# Patient Record
Sex: FEMALE | Race: WHITE | Employment: PART TIME | URBAN - METROPOLITAN AREA
[De-identification: names, ages, dates, MRNs, and addresses within clinical notes are randomized per-mention and may not be internally consistent; named-entity substitution may affect disease eponyms.]

---

## 2018-03-27 ENCOUNTER — TELEPHONE (OUTPATIENT)
Dept: FAMILY MEDICINE CLINIC | Age: 43
End: 2018-03-27

## 2018-03-27 DIAGNOSIS — Z00.00 ENCOUNTER FOR WELL ADULT EXAM WITHOUT ABNORMAL FINDINGS: Primary | ICD-10-CM

## 2018-03-27 DIAGNOSIS — Z13.220 LIPID SCREENING: ICD-10-CM

## 2018-04-16 ENCOUNTER — OFFICE VISIT (OUTPATIENT)
Dept: FAMILY MEDICINE CLINIC | Age: 43
End: 2018-04-16
Payer: COMMERCIAL

## 2018-04-16 VITALS
DIASTOLIC BLOOD PRESSURE: 88 MMHG | BODY MASS INDEX: 33.99 KG/M2 | OXYGEN SATURATION: 97 % | RESPIRATION RATE: 16 BRPM | HEIGHT: 61 IN | HEART RATE: 90 BPM | SYSTOLIC BLOOD PRESSURE: 128 MMHG | TEMPERATURE: 98.2 F | WEIGHT: 180 LBS

## 2018-04-16 DIAGNOSIS — H66.90 ACUTE OTITIS MEDIA, UNSPECIFIED OTITIS MEDIA TYPE: Primary | ICD-10-CM

## 2018-04-16 PROCEDURE — 99213 OFFICE O/P EST LOW 20 MIN: CPT | Performed by: PHYSICIAN ASSISTANT

## 2018-04-16 RX ORDER — CEFDINIR 300 MG/1
300 CAPSULE ORAL 2 TIMES DAILY
Qty: 20 CAPSULE | Refills: 0 | Status: SHIPPED | OUTPATIENT
Start: 2018-04-16 | End: 2018-04-26

## 2018-04-16 RX ORDER — M-VIT,TX,IRON,MINS/CALC/FOLIC 27MG-0.4MG
1 TABLET ORAL DAILY
COMMUNITY
End: 2019-11-11

## 2018-04-20 ENCOUNTER — HOSPITAL ENCOUNTER (OUTPATIENT)
Age: 43
Discharge: HOME | End: 2018-04-22
Payer: COMMERCIAL

## 2018-04-20 ENCOUNTER — NURSE ONLY (OUTPATIENT)
Dept: FAMILY MEDICINE CLINIC | Age: 43
End: 2018-04-20
Payer: COMMERCIAL

## 2018-04-20 DIAGNOSIS — Z00.00 ENCOUNTER FOR WELL ADULT EXAM WITHOUT ABNORMAL FINDINGS: ICD-10-CM

## 2018-04-20 DIAGNOSIS — Z13.220 LIPID SCREENING: ICD-10-CM

## 2018-04-20 DIAGNOSIS — Z13.220 LIPID SCREENING: Primary | ICD-10-CM

## 2018-04-20 LAB
ALBUMIN SERPL-MCNC: 4.1 G/DL (ref 3.5–5.2)
ALP BLD-CCNC: 67 U/L (ref 35–104)
ALT SERPL-CCNC: 21 U/L (ref 0–32)
ANION GAP SERPL CALCULATED.3IONS-SCNC: 14 MMOL/L (ref 7–16)
AST SERPL-CCNC: 19 U/L (ref 0–31)
BASOPHILS ABSOLUTE: 0.04 E9/L (ref 0–0.2)
BASOPHILS RELATIVE PERCENT: 0.5 % (ref 0–2)
BILIRUB SERPL-MCNC: 0.4 MG/DL (ref 0–1.2)
BUN BLDV-MCNC: 9 MG/DL (ref 6–20)
CALCIUM SERPL-MCNC: 9.2 MG/DL (ref 8.6–10.2)
CHLORIDE BLD-SCNC: 102 MMOL/L (ref 98–107)
CHOLESTEROL, TOTAL: 223 MG/DL (ref 0–199)
CO2: 24 MMOL/L (ref 22–29)
CREAT SERPL-MCNC: 0.7 MG/DL (ref 0.5–1)
EOSINOPHILS ABSOLUTE: 0.28 E9/L (ref 0.05–0.5)
EOSINOPHILS RELATIVE PERCENT: 3.6 % (ref 0–6)
GFR AFRICAN AMERICAN: >60
GFR NON-AFRICAN AMERICAN: >60 ML/MIN/1.73
GLUCOSE BLD-MCNC: 80 MG/DL (ref 74–109)
HCT VFR BLD CALC: 42 % (ref 34–48)
HDLC SERPL-MCNC: 51 MG/DL
HEMOGLOBIN: 13.3 G/DL (ref 11.5–15.5)
IMMATURE GRANULOCYTES #: 0.02 E9/L
IMMATURE GRANULOCYTES %: 0.3 % (ref 0–5)
LDL CHOLESTEROL CALCULATED: 142 MG/DL (ref 0–99)
LYMPHOCYTES ABSOLUTE: 1.73 E9/L (ref 1.5–4)
LYMPHOCYTES RELATIVE PERCENT: 22.1 % (ref 20–42)
MCH RBC QN AUTO: 28.6 PG (ref 26–35)
MCHC RBC AUTO-ENTMCNC: 31.7 % (ref 32–34.5)
MCV RBC AUTO: 90.3 FL (ref 80–99.9)
MONOCYTES ABSOLUTE: 0.48 E9/L (ref 0.1–0.95)
MONOCYTES RELATIVE PERCENT: 6.1 % (ref 2–12)
NEUTROPHILS ABSOLUTE: 5.29 E9/L (ref 1.8–7.3)
NEUTROPHILS RELATIVE PERCENT: 67.4 % (ref 43–80)
PDW BLD-RTO: 14.2 FL (ref 11.5–15)
PLATELET # BLD: 310 E9/L (ref 130–450)
PMV BLD AUTO: 10.3 FL (ref 7–12)
POTASSIUM SERPL-SCNC: 4.1 MMOL/L (ref 3.5–5)
RBC # BLD: 4.65 E12/L (ref 3.5–5.5)
SODIUM BLD-SCNC: 140 MMOL/L (ref 132–146)
TOTAL PROTEIN: 7.2 G/DL (ref 6.4–8.3)
TRIGL SERPL-MCNC: 150 MG/DL (ref 0–149)
VITAMIN D 25-HYDROXY: 25 NG/ML (ref 30–100)
VLDLC SERPL CALC-MCNC: 30 MG/DL
WBC # BLD: 7.8 E9/L (ref 4.5–11.5)

## 2018-04-20 PROCEDURE — 80053 COMPREHEN METABOLIC PANEL: CPT

## 2018-04-20 PROCEDURE — 82306 VITAMIN D 25 HYDROXY: CPT

## 2018-04-20 PROCEDURE — 85025 COMPLETE CBC W/AUTO DIFF WBC: CPT

## 2018-04-20 PROCEDURE — 36415 COLL VENOUS BLD VENIPUNCTURE: CPT | Performed by: FAMILY MEDICINE

## 2018-04-20 PROCEDURE — 80061 LIPID PANEL: CPT

## 2018-04-24 ENCOUNTER — OFFICE VISIT (OUTPATIENT)
Dept: FAMILY MEDICINE CLINIC | Age: 43
End: 2018-04-24
Payer: COMMERCIAL

## 2018-04-24 VITALS
HEIGHT: 61 IN | BODY MASS INDEX: 33.61 KG/M2 | WEIGHT: 178 LBS | RESPIRATION RATE: 17 BRPM | OXYGEN SATURATION: 99 % | SYSTOLIC BLOOD PRESSURE: 120 MMHG | DIASTOLIC BLOOD PRESSURE: 82 MMHG | HEART RATE: 68 BPM | TEMPERATURE: 98.4 F

## 2018-04-24 DIAGNOSIS — H69.81 EUSTACHIAN TUBE DYSFUNCTION, RIGHT: Primary | ICD-10-CM

## 2018-04-24 DIAGNOSIS — R59.0 ADENOPATHY, CERVICAL: ICD-10-CM

## 2018-04-24 PROCEDURE — 99213 OFFICE O/P EST LOW 20 MIN: CPT | Performed by: PHYSICIAN ASSISTANT

## 2018-04-24 RX ORDER — FLUTICASONE PROPIONATE 50 MCG
2 SPRAY, SUSPENSION (ML) NASAL DAILY
Qty: 1 BOTTLE | Refills: 0 | Status: SHIPPED | OUTPATIENT
Start: 2018-04-24 | End: 2018-09-19 | Stop reason: SDUPTHER

## 2018-04-24 RX ORDER — NAPROXEN 500 MG/1
500 TABLET ORAL 2 TIMES DAILY PRN
Qty: 20 TABLET | Refills: 0 | Status: SHIPPED | OUTPATIENT
Start: 2018-04-24 | End: 2018-09-07 | Stop reason: ALTCHOICE

## 2018-04-27 ENCOUNTER — OFFICE VISIT (OUTPATIENT)
Dept: FAMILY MEDICINE CLINIC | Age: 43
End: 2018-04-27
Payer: COMMERCIAL

## 2018-04-27 VITALS
BODY MASS INDEX: 33.82 KG/M2 | OXYGEN SATURATION: 99 % | WEIGHT: 179 LBS | TEMPERATURE: 97.5 F | SYSTOLIC BLOOD PRESSURE: 124 MMHG | HEART RATE: 72 BPM | DIASTOLIC BLOOD PRESSURE: 86 MMHG

## 2018-04-27 DIAGNOSIS — J30.1 CHRONIC SEASONAL ALLERGIC RHINITIS DUE TO POLLEN: ICD-10-CM

## 2018-04-27 DIAGNOSIS — E66.3 EXCESS WEIGHT: ICD-10-CM

## 2018-04-27 DIAGNOSIS — R92.2 DENSE BREAST TISSUE ON MAMMOGRAM: ICD-10-CM

## 2018-04-27 DIAGNOSIS — Z00.00 ENCOUNTER FOR WELL ADULT EXAM WITHOUT ABNORMAL FINDINGS: Primary | ICD-10-CM

## 2018-04-27 DIAGNOSIS — E78.5 HYPERLIPIDEMIA, UNSPECIFIED HYPERLIPIDEMIA TYPE: ICD-10-CM

## 2018-04-27 DIAGNOSIS — K29.50 CHRONIC GASTRITIS, PRESENCE OF BLEEDING UNSPECIFIED, UNSPECIFIED GASTRITIS TYPE: ICD-10-CM

## 2018-04-27 DIAGNOSIS — Z12.31 ENCOUNTER FOR MAMMOGRAM TO ESTABLISH BASELINE MAMMOGRAM: ICD-10-CM

## 2018-04-27 PROCEDURE — 99214 OFFICE O/P EST MOD 30 MIN: CPT | Performed by: FAMILY MEDICINE

## 2018-04-27 RX ORDER — RANITIDINE 150 MG/1
150 TABLET ORAL 2 TIMES DAILY PRN
Qty: 60 TABLET | Refills: 2 | Status: SHIPPED | OUTPATIENT
Start: 2018-04-27 | End: 2018-09-07 | Stop reason: ALTCHOICE

## 2018-04-27 RX ORDER — CETIRIZINE HYDROCHLORIDE 10 MG/1
10 TABLET ORAL DAILY
Qty: 30 TABLET | Refills: 5 | Status: SHIPPED | OUTPATIENT
Start: 2018-04-27 | End: 2019-01-16 | Stop reason: SDUPTHER

## 2018-05-14 ENCOUNTER — TELEPHONE (OUTPATIENT)
Dept: FAMILY MEDICINE CLINIC | Age: 43
End: 2018-05-14

## 2018-07-02 DIAGNOSIS — R92.2 DENSE BREAST TISSUE ON MAMMOGRAM: ICD-10-CM

## 2018-07-02 DIAGNOSIS — Z12.31 ENCOUNTER FOR MAMMOGRAM TO ESTABLISH BASELINE MAMMOGRAM: ICD-10-CM

## 2018-07-24 ENCOUNTER — TELEPHONE (OUTPATIENT)
Dept: FAMILY MEDICINE CLINIC | Age: 43
End: 2018-07-24

## 2018-07-24 NOTE — PROGRESS NOTES
Subjective:      Patient ID: León Mercedes is a 39 y.o. female. Spoke with patient, normal mammogram and US. Repeat 1 year.         Review of Systems    Objective:   Physical Exam    Assessment:      ***      Plan:      ***

## 2018-07-24 NOTE — TELEPHONE ENCOUNTER
Spoke with patient regarding mammogram and US reports- dr reviewed, both tests negative, repeat in 1 year, patient verbalizes understanding.

## 2018-08-07 ENCOUNTER — TELEPHONE (OUTPATIENT)
Dept: FAMILY MEDICINE CLINIC | Age: 43
End: 2018-08-07

## 2018-08-07 DIAGNOSIS — Z80.0 FAMILY HISTORY OF COLON CANCER: Primary | ICD-10-CM

## 2018-08-07 DIAGNOSIS — R19.4 CHANGE IN BOWEL HABITS: ICD-10-CM

## 2018-08-08 NOTE — TELEPHONE ENCOUNTER
Spoke with pt and she states yes about her grandmother. She reports having change in her bowels for the last month. She states she has diarrhea and blood. She is not sure if the blood is form a hemorrhoids.

## 2018-08-21 ENCOUNTER — NURSE ONLY (OUTPATIENT)
Dept: FAMILY MEDICINE CLINIC | Age: 43
End: 2018-08-21
Payer: COMMERCIAL

## 2018-08-21 ENCOUNTER — HOSPITAL ENCOUNTER (OUTPATIENT)
Age: 43
Discharge: HOME | End: 2018-08-23
Payer: COMMERCIAL

## 2018-08-21 ENCOUNTER — TELEPHONE (OUTPATIENT)
Dept: FAMILY MEDICINE CLINIC | Age: 43
End: 2018-08-21

## 2018-08-21 DIAGNOSIS — R30.0 DYSURIA: Primary | ICD-10-CM

## 2018-08-21 DIAGNOSIS — R30.0 DYSURIA: ICD-10-CM

## 2018-08-21 LAB
BILIRUBIN, POC: NORMAL
BLOOD URINE, POC: NORMAL
CLARITY, POC: CLEAR
COLOR, POC: YELLOW
GLUCOSE URINE, POC: NORMAL
KETONES, POC: NORMAL
LEUKOCYTE EST, POC: NORMAL
NITRITE, POC: NORMAL
PH, POC: 6
PROTEIN, POC: NORMAL
SPECIFIC GRAVITY, POC: 1
UROBILINOGEN, POC: 0.2

## 2018-08-21 PROCEDURE — 81002 URINALYSIS NONAUTO W/O SCOPE: CPT | Performed by: FAMILY MEDICINE

## 2018-08-21 PROCEDURE — 87088 URINE BACTERIA CULTURE: CPT

## 2018-08-21 RX ORDER — SULFAMETHOXAZOLE AND TRIMETHOPRIM 800; 160 MG/1; MG/1
1 TABLET ORAL 2 TIMES DAILY
Qty: 6 TABLET | Refills: 0 | Status: SHIPPED | OUTPATIENT
Start: 2018-08-21 | End: 2018-08-24

## 2018-08-23 LAB — URINE CULTURE, ROUTINE: NORMAL

## 2018-08-30 ENCOUNTER — PREP FOR PROCEDURE (OUTPATIENT)
Dept: SURGERY | Age: 43
End: 2018-08-30

## 2018-08-30 ENCOUNTER — OFFICE VISIT (OUTPATIENT)
Dept: SURGERY | Age: 43
End: 2018-08-30
Payer: COMMERCIAL

## 2018-08-30 VITALS
HEART RATE: 80 BPM | BODY MASS INDEX: 32.85 KG/M2 | SYSTOLIC BLOOD PRESSURE: 129 MMHG | WEIGHT: 174 LBS | TEMPERATURE: 98.3 F | RESPIRATION RATE: 16 BRPM | DIASTOLIC BLOOD PRESSURE: 85 MMHG | HEIGHT: 61 IN | OXYGEN SATURATION: 96 %

## 2018-08-30 DIAGNOSIS — R19.4 CHANGE IN BOWEL HABITS: Primary | ICD-10-CM

## 2018-08-30 PROCEDURE — 99243 OFF/OP CNSLTJ NEW/EST LOW 30: CPT | Performed by: SURGERY

## 2018-08-30 RX ORDER — 0.9 % SODIUM CHLORIDE 0.9 %
10 VIAL (ML) INJECTION PRN
Status: CANCELLED | OUTPATIENT
Start: 2018-08-30 | End: 2019-08-30

## 2018-08-30 RX ORDER — SODIUM CHLORIDE 9 MG/ML
INJECTION, SOLUTION INTRAVENOUS CONTINUOUS
Status: CANCELLED | OUTPATIENT
Start: 2018-08-30 | End: 2019-08-30

## 2018-08-30 RX ORDER — 0.9 % SODIUM CHLORIDE 0.9 %
10 VIAL (ML) INJECTION EVERY 12 HOURS SCHEDULED
Status: CANCELLED | OUTPATIENT
Start: 2018-08-30 | End: 2019-08-30

## 2018-08-30 NOTE — LETTER
[address] [address]  Phone: [phone]  Fax: [phone]    Xochitl Stallings MD        August 30, 2018       Patient: Yola Sánchez   MR Number: [id number]   YOB: 1975   Date of Visit: 8/30/2018       Dear Dr. Bri Tomas: Thank you for the request for consultation for Yola Sánchez to me for the evaluation of her colon. Below are the relevant portions of my assessment and plan of care. ASSESSMENT AND PLAN:       Assessment: Yola Fletcher is an 39 y.o. female who presents for a colonoscopy with rectal bleeding, change in bowel habits    Plan: I will set the patient up for a colonoscopy, possible biopsy, possible polypectomy. If you have questions, please do not hesitate to call me. I look forward to following April along with you. Sincerely,        Fernandez Fisher MD    CC providers: Hazel Stanford MD  [facility] [address]  [address] [facility]

## 2018-08-30 NOTE — PATIENT INSTRUCTIONS
and grape-flavored ice pops. It also includes fruit punch and cherry gelatin. · Drink the \"colon prep\" liquid as your doctor tells you. You will want to stay home, because the liquid will make you go to the bathroom a lot. Your stools will be loose and watery. It is very important to drink all of the liquid. If you have problems drinking it, call your doctor. Some doctors may have you take a tablet rather than drink a liquid. · Do not eat any solid foods after you drink the colon prep. · Stop drinking clear liquids 6 to 8 hours before the test.  What happens on the day of the procedure? · Follow the instructions exactly about when to stop eating and drinking. If you don't, your procedure may be canceled. If your doctor told you to take your medicines on the day of the procedure, take them with only a sip of water. · Take a bath or shower before you come in for your procedure. Do not apply lotions, perfumes, deodorants, or nail polish. · Take off all jewelry and piercings. And take out contact lenses, if you wear them. At the [facility] office or hospital  · Bring a picture ID. · You will be kept comfortable and safe by your anesthesia provider. The anesthesia may make you sleep. · You will lie on your back or your side with your knees drawn up toward your belly. The doctor will gently put a gloved finger into your anus. Then the doctor puts the scope in and moves it into your colon. The scope goes in easily because it is lubricated. · The doctor may also use small tools to take tissue samples for a biopsy or to remove polyps. This does not hurt. · The test usually takes 30 to 45 minutes. But it may take longer. It depends on what is found and what is done. Going home  · Be sure you have someone to drive you home. Anesthesia and pain medicine make it unsafe for you to drive. · You will be given more specific instructions about recovering from your procedure. When should you call your doctor?   · You have questions or concerns. · You don't understand how to prepare for your procedure. · You are having trouble with the bowel prep. · You become ill before the procedure (such as fever, flu, or a cold). · You need to reschedule or have changed your mind about having the procedure. Where can you learn more? Go to https://Virgin Mobile Latin [email or web address].Testlio. org and sign in to your LittleLives account. Enter C315 in the Quitbit box to learn more about Colonoscopy: Before Your Procedure.     If you do not have an account, please click on the Sign Up Now link. © 0614-5308 Healthwise, Incorporated. Care instructions adapted under license by [facility] ([facility]). This care instruction is for use with your licensed healthcare professional. If you have questions about a medical condition or this instruction, always ask your healthcare professional. [address] any warranty or liability for your use of this information.   Content Version: 61.0.059188; Current as of: November 20, 2015

## 2018-09-07 RX ORDER — OMEPRAZOLE 20 MG/1
20 CAPSULE, DELAYED RELEASE ORAL PRN
COMMUNITY
End: 2019-06-04 | Stop reason: SDUPTHER

## 2018-09-07 NOTE — PROGRESS NOTES
products on the day of surgery    [x] If not already done, you can expect a call from registration    [x] You can expect a call the business day prior to procedure to notify you if your arrival time changes    [x] If you receive a survey after surgery we would greatly appreciate your comments    [] Parent/guardian of a minor must accompany their child and remain on the premises  the entire time they are under our care     [] Pediatric patients may bring favorite toy, blanket or comfort item with them    [] A caregiver or family member must remain with the patient during their stay if they are mentally handicapped, have dementia, disoriented or unable to use a call light or would be a safety concern if left unattended    [x] Please notify surgeon if you develop any illness between now and time of surgery (cold, cough, sore throat, fever, nausea, vomiting) or any signs of infections  including skin, wounds, and dental.    [] Other instructions    EDUCATIONAL MATERIALS PROVIDED:    [] PAT Preoperative Education Packet/Booklet     [] Medication List    [] Fluoroscopy Information Pamphlet    [] Transfusion bracelet applied with instructions    [] Joint replacement video reviewed    [] Shower with soap, lather and rinse well, and use CHG wipes provided the evening before surgery as instructed

## 2018-09-17 ENCOUNTER — ANESTHESIA EVENT (OUTPATIENT)
Dept: ENDOSCOPY | Age: 43
End: 2018-09-17
Payer: COMMERCIAL

## 2018-09-17 ENCOUNTER — HOSPITAL ENCOUNTER (OUTPATIENT)
Age: 43
Setting detail: OUTPATIENT SURGERY
Discharge: HOME | End: 2018-09-17
Attending: SURGERY | Admitting: SURGERY
Payer: COMMERCIAL

## 2018-09-17 ENCOUNTER — ANESTHESIA (OUTPATIENT)
Dept: ENDOSCOPY | Age: 43
End: 2018-09-17
Payer: COMMERCIAL

## 2018-09-17 VITALS
DIASTOLIC BLOOD PRESSURE: 68 MMHG | RESPIRATION RATE: 15 BRPM | OXYGEN SATURATION: 93 % | SYSTOLIC BLOOD PRESSURE: 136 MMHG

## 2018-09-17 VITALS
BODY MASS INDEX: 32.47 KG/M2 | HEART RATE: 80 BPM | HEIGHT: 61 IN | RESPIRATION RATE: 18 BRPM | TEMPERATURE: 98 F | WEIGHT: 172 LBS | SYSTOLIC BLOOD PRESSURE: 120 MMHG | OXYGEN SATURATION: 97 % | DIASTOLIC BLOOD PRESSURE: 70 MMHG

## 2018-09-17 PROCEDURE — 3609009500 HC COLONOSCOPY DIAGNOSTIC OR SCREENING: Performed by: SURGERY

## 2018-09-17 PROCEDURE — 2580000003 HC RX 258: Performed by: NURSE ANESTHETIST, CERTIFIED REGISTERED

## 2018-09-17 PROCEDURE — 7100000010 HC PHASE II RECOVERY - FIRST 15 MIN: Performed by: SURGERY

## 2018-09-17 PROCEDURE — 7100000011 HC PHASE II RECOVERY - ADDTL 15 MIN: Performed by: SURGERY

## 2018-09-17 PROCEDURE — 3700000000 HC ANESTHESIA ATTENDED CARE: Performed by: SURGERY

## 2018-09-17 PROCEDURE — 45378 DIAGNOSTIC COLONOSCOPY: CPT | Performed by: SURGERY

## 2018-09-17 PROCEDURE — 2709999900 HC NON-CHARGEABLE SUPPLY: Performed by: SURGERY

## 2018-09-17 PROCEDURE — 6360000002 HC RX W HCPCS: Performed by: NURSE ANESTHETIST, CERTIFIED REGISTERED

## 2018-09-17 PROCEDURE — 3700000001 HC ADD 15 MINUTES (ANESTHESIA): Performed by: SURGERY

## 2018-09-17 RX ORDER — FENTANYL CITRATE 50 UG/ML
INJECTION, SOLUTION INTRAMUSCULAR; INTRAVENOUS PRN
Status: DISCONTINUED | OUTPATIENT
Start: 2018-09-17 | End: 2018-09-17 | Stop reason: SDUPTHER

## 2018-09-17 RX ORDER — SODIUM CHLORIDE 9 MG/ML
INJECTION, SOLUTION INTRAVENOUS CONTINUOUS
Status: DISCONTINUED | OUTPATIENT
Start: 2018-09-17 | End: 2018-09-17 | Stop reason: HOSPADM

## 2018-09-17 RX ORDER — PROPOFOL 10 MG/ML
INJECTION, EMULSION INTRAVENOUS PRN
Status: DISCONTINUED | OUTPATIENT
Start: 2018-09-17 | End: 2018-09-17 | Stop reason: SDUPTHER

## 2018-09-17 RX ORDER — SODIUM CHLORIDE 9 MG/ML
INJECTION, SOLUTION INTRAVENOUS CONTINUOUS PRN
Status: DISCONTINUED | OUTPATIENT
Start: 2018-09-17 | End: 2018-09-17 | Stop reason: SDUPTHER

## 2018-09-17 RX ORDER — MIDAZOLAM HYDROCHLORIDE 1 MG/ML
INJECTION INTRAMUSCULAR; INTRAVENOUS PRN
Status: DISCONTINUED | OUTPATIENT
Start: 2018-09-17 | End: 2018-09-17 | Stop reason: SDUPTHER

## 2018-09-17 RX ORDER — 0.9 % SODIUM CHLORIDE 0.9 %
10 VIAL (ML) INJECTION PRN
Status: DISCONTINUED | OUTPATIENT
Start: 2018-09-17 | End: 2018-09-17 | Stop reason: HOSPADM

## 2018-09-17 RX ORDER — 0.9 % SODIUM CHLORIDE 0.9 %
10 VIAL (ML) INJECTION EVERY 12 HOURS SCHEDULED
Status: DISCONTINUED | OUTPATIENT
Start: 2018-09-17 | End: 2018-09-17 | Stop reason: HOSPADM

## 2018-09-17 RX ADMIN — PROPOFOL 150 MG: 10 INJECTION, EMULSION INTRAVENOUS at 11:03

## 2018-09-17 RX ADMIN — MIDAZOLAM HYDROCHLORIDE 2 MG: 1 INJECTION, SOLUTION INTRAMUSCULAR; INTRAVENOUS at 11:09

## 2018-09-17 RX ADMIN — FENTANYL CITRATE 100 MCG: 50 INJECTION, SOLUTION INTRAMUSCULAR; INTRAVENOUS at 11:03

## 2018-09-17 RX ADMIN — PROPOFOL 20 MG: 10 INJECTION, EMULSION INTRAVENOUS at 11:16

## 2018-09-17 RX ADMIN — SODIUM CHLORIDE: 9 INJECTION, SOLUTION INTRAVENOUS at 10:35

## 2018-09-17 RX ADMIN — PROPOFOL 100 MG: 10 INJECTION, EMULSION INTRAVENOUS at 11:12

## 2018-09-17 ASSESSMENT — PAIN - FUNCTIONAL ASSESSMENT: PAIN_FUNCTIONAL_ASSESSMENT: 0-10

## 2018-09-17 NOTE — ANESTHESIA PRE PROCEDURE
migraine     PONV (postoperative nausea and vomiting)        Past Surgical History:        Procedure Laterality Date    ADENOIDECTOMY  0     SECTION     Ce [place], [facility] SURGERY  2015    L4 or L5 Microdiscectomy    UPPER GASTROINTESTINAL ENDOSCOPY      resulted with alkaline reflux, Dr. Stephie Dent History:    Social History   Substance Use Topics    Smoking status: Never Smoker    Smokeless tobacco: Never Used    Alcohol use 1.2 oz/week     2 Glasses of wine per week      Comment: social                                Counseling given: Not Answered      Vital Signs (Current):   Vitals:    18 1059 18 1004   BP:  125/81   Pulse:  80   Resp:  18   Temp:  97 °F (36.1 °C)   TempSrc:  Temporal   SpO2:  99%   Weight: 172 lb (78 kg) 172 lb (78 kg)   Height: 5' 1\" (1.549 m) 5' 1\" (1.549 m)                                              BP Readings from Last 3 Encounters:   18 125/81   18 129/85   18 124/86       NPO Status: Time of last liquid consumption:                         Time of last solid consumption: 900                        Date of last liquid consumption: 18                        Date of last solid food consumption: 18    BMI:   Wt Readings from Last 3 Encounters:   18 172 lb (78 kg)   18 174 lb (78.9 kg)   18 179 lb (81.2 kg)     Body mass index is 32.5 kg/m².     CBC:   Lab Results   Component Value Date    WBC 7.8 2018    RBC 4.65 2018    HGB 13.3 2018    HCT 42.0 2018    MCV 90.3 2018    RDW 14.2 2018     2018       CMP:   Lab Results   Component Value Date     2018    K 4.1 2018     2018    CO2 24 2018    BUN 9 2018    CREATININE 0.7 2018    GFRAA >60 2018    LABGLOM >60 2018    GLUCOSE 80 2018    PROT 7.2 2018

## 2018-09-17 NOTE — ANESTHESIA POSTPROCEDURE EVALUATION
Department of Anesthesiology  Postprocedure Note    Patient: April D Rosalind Swain  MRN: [id number]  YOB: 1975  Date of evaluation: 9/17/2018  Time:  12:10 PM     Procedure Summary     Date:  09/17/18 Room / Location:  [place] [address] / [facility] ENDOSCOPY    Anesthesia Start:  1102 Anesthesia Stop:  0059    Procedure:  COLONOSCOPY DIAGNOSTIC (N/A ) Diagnosis:  (Oleta Pro)    Surgeon:  Andreas Nash MD Responsible Provider:  José Sellers MD    Anesthesia Type:  MAC ASA Status:  2          Anesthesia Type: MAC    Cheng Phase I: Cheng Score: 10    Cheng Phase II: Cheng Score: 10    Last vitals: Reviewed and per EMR flowsheets.        Anesthesia Post Evaluation    Patient location during evaluation: PACU  Patient participation: complete - patient participated  Level of consciousness: awake and alert  Pain score: 0  Airway patency: patent  Nausea & Vomiting: no vomiting and no nausea  Complications: no  Cardiovascular status: hemodynamically stable  Respiratory status: spontaneous ventilation  Hydration status: stable

## 2018-09-18 RX ORDER — FLUTICASONE PROPIONATE 50 MCG
SPRAY, SUSPENSION (ML) NASAL
Qty: 16 G | Refills: 0 | OUTPATIENT
Start: 2018-09-18

## 2018-09-19 RX ORDER — FLUTICASONE PROPIONATE 50 MCG
2 SPRAY, SUSPENSION (ML) NASAL DAILY
Qty: 1 BOTTLE | Refills: 2 | Status: SHIPPED | OUTPATIENT
Start: 2018-09-19 | End: 2019-05-03

## 2018-10-22 ENCOUNTER — OFFICE VISIT (OUTPATIENT)
Dept: FAMILY MEDICINE CLINIC | Age: 43
End: 2018-10-22
Payer: COMMERCIAL

## 2018-10-22 VITALS
HEART RATE: 70 BPM | DIASTOLIC BLOOD PRESSURE: 80 MMHG | BODY MASS INDEX: 33.44 KG/M2 | SYSTOLIC BLOOD PRESSURE: 138 MMHG | WEIGHT: 177 LBS | OXYGEN SATURATION: 97 % | TEMPERATURE: 97.7 F

## 2018-10-22 DIAGNOSIS — M77.11 RIGHT LATERAL EPICONDYLITIS: ICD-10-CM

## 2018-10-22 DIAGNOSIS — G47.33 OSA (OBSTRUCTIVE SLEEP APNEA): Primary | ICD-10-CM

## 2018-10-22 PROCEDURE — 99214 OFFICE O/P EST MOD 30 MIN: CPT | Performed by: FAMILY MEDICINE

## 2018-10-22 ASSESSMENT — PATIENT HEALTH QUESTIONNAIRE - PHQ9
SUM OF ALL RESPONSES TO PHQ QUESTIONS 1-9: 0
2. FEELING DOWN, DEPRESSED OR HOPELESS: 0
1. LITTLE INTEREST OR PLEASURE IN DOING THINGS: 0
SUM OF ALL RESPONSES TO PHQ QUESTIONS 1-9: 0
SUM OF ALL RESPONSES TO PHQ9 QUESTIONS 1 & 2: 0

## 2018-11-01 ENCOUNTER — TELEPHONE (OUTPATIENT)
Dept: FAMILY MEDICINE CLINIC | Age: 43
End: 2018-11-01

## 2018-11-01 NOTE — TELEPHONE ENCOUNTER
Pt phoned , she saw  on 10/22/18 and he placed a referral for a sleep study at [facility]. She has not heard from anyone and wanted to check on it. Can you please check on this for her? Thanks!

## 2018-11-12 NOTE — TELEPHONE ENCOUNTER
Faxed to Jayna [address] at [facility], they have it and are working on it.  Called patient and let her know she should be hearing from them

## 2018-11-27 ENCOUNTER — TELEPHONE (OUTPATIENT)
Dept: FAMILY MEDICINE CLINIC | Age: 43
End: 2018-11-27

## 2018-11-27 DIAGNOSIS — G47.33 OBSTRUCTIVE SLEEP APNEA SYNDROME: Primary | ICD-10-CM

## 2018-11-28 NOTE — TELEPHONE ENCOUNTER
[facility],   Is process same as for that other patient you checked on? If so, I will do the same thing. If she needs a home study referral or something like that, then I'll order.

## 2018-12-03 ENCOUNTER — TELEPHONE (OUTPATIENT)
Dept: FAMILY MEDICINE CLINIC | Age: 43
End: 2018-12-03

## 2019-01-15 ENCOUNTER — TELEPHONE (OUTPATIENT)
Dept: FAMILY MEDICINE CLINIC | Age: 44
End: 2019-01-15

## 2019-01-15 ENCOUNTER — HOSPITAL ENCOUNTER (OUTPATIENT)
Age: 44
Discharge: HOME | End: 2019-01-17
Payer: COMMERCIAL

## 2019-01-15 ENCOUNTER — NURSE ONLY (OUTPATIENT)
Dept: FAMILY MEDICINE CLINIC | Age: 44
End: 2019-01-15
Payer: COMMERCIAL

## 2019-01-15 DIAGNOSIS — R30.0 DYSURIA: Primary | ICD-10-CM

## 2019-01-15 DIAGNOSIS — G47.33 OBSTRUCTIVE SLEEP APNEA SYNDROME: ICD-10-CM

## 2019-01-15 LAB
BACTERIA: NORMAL /HPF
BILIRUBIN URINE: NEGATIVE
BILIRUBIN, POC: NEGATIVE
BLOOD URINE, POC: NORMAL
BLOOD, URINE: NORMAL
CLARITY, POC: NORMAL
CLARITY: CLEAR
COLOR, POC: YELLOW
COLOR: YELLOW
EPITHELIAL CELLS, UA: NORMAL /HPF
GLUCOSE URINE, POC: NEGATIVE
GLUCOSE URINE: NEGATIVE MG/DL
KETONES, POC: NEGATIVE
KETONES, URINE: NEGATIVE MG/DL
LEUKOCYTE EST, POC: NEGATIVE
LEUKOCYTE ESTERASE, URINE: NEGATIVE
NITRITE, POC: NEGATIVE
NITRITE, URINE: NEGATIVE
PH UA: 6.5 (ref 5–9)
PH, POC: 6.5
PROTEIN UA: NEGATIVE MG/DL
PROTEIN, POC: NEGATIVE
RBC UA: NORMAL /HPF (ref 0–2)
SPECIFIC GRAVITY UA: 1.02 (ref 1–1.03)
SPECIFIC GRAVITY, POC: 1.02
UROBILINOGEN, POC: 0.2
UROBILINOGEN, URINE: 0.2 E.U./DL
WBC UA: NORMAL /HPF (ref 0–5)

## 2019-01-15 PROCEDURE — 87088 URINE BACTERIA CULTURE: CPT

## 2019-01-15 PROCEDURE — 81001 URINALYSIS AUTO W/SCOPE: CPT

## 2019-01-15 PROCEDURE — 81002 URINALYSIS NONAUTO W/O SCOPE: CPT | Performed by: FAMILY MEDICINE

## 2019-01-16 ENCOUNTER — OFFICE VISIT (OUTPATIENT)
Dept: FAMILY MEDICINE CLINIC | Age: 44
End: 2019-01-16
Payer: COMMERCIAL

## 2019-01-16 ENCOUNTER — HOSPITAL ENCOUNTER (OUTPATIENT)
Age: 44
Discharge: HOME | End: 2019-01-18
Payer: COMMERCIAL

## 2019-01-16 VITALS
BODY MASS INDEX: 33.99 KG/M2 | WEIGHT: 180 LBS | DIASTOLIC BLOOD PRESSURE: 82 MMHG | OXYGEN SATURATION: 98 % | SYSTOLIC BLOOD PRESSURE: 120 MMHG | HEIGHT: 61 IN | TEMPERATURE: 97.4 F | HEART RATE: 58 BPM

## 2019-01-16 DIAGNOSIS — M25.511 CHRONIC RIGHT SHOULDER PAIN: ICD-10-CM

## 2019-01-16 DIAGNOSIS — R10.11 RUQ PAIN: Primary | ICD-10-CM

## 2019-01-16 DIAGNOSIS — R61 NIGHT SWEATS: ICD-10-CM

## 2019-01-16 DIAGNOSIS — R10.11 RUQ PAIN: ICD-10-CM

## 2019-01-16 DIAGNOSIS — G89.29 CHRONIC RIGHT SHOULDER PAIN: ICD-10-CM

## 2019-01-16 DIAGNOSIS — J30.1 CHRONIC SEASONAL ALLERGIC RHINITIS DUE TO POLLEN: ICD-10-CM

## 2019-01-16 LAB
ALBUMIN SERPL-MCNC: 4.4 G/DL (ref 3.5–5.2)
ALP BLD-CCNC: 60 U/L (ref 35–104)
ALT SERPL-CCNC: 13 U/L (ref 0–32)
ANION GAP SERPL CALCULATED.3IONS-SCNC: 14 MMOL/L (ref 7–16)
AST SERPL-CCNC: 14 U/L (ref 0–31)
BASOPHILS ABSOLUTE: 0.04 E9/L (ref 0–0.2)
BASOPHILS RELATIVE PERCENT: 0.5 % (ref 0–2)
BILIRUB SERPL-MCNC: 0.3 MG/DL (ref 0–1.2)
BILIRUBIN, POC: NORMAL
BLOOD URINE, POC: NORMAL
BUN BLDV-MCNC: 9 MG/DL (ref 6–20)
CALCIUM SERPL-MCNC: 9 MG/DL (ref 8.6–10.2)
CHLORIDE BLD-SCNC: 102 MMOL/L (ref 98–107)
CLARITY, POC: CLEAR
CO2: 24 MMOL/L (ref 22–29)
COLOR, POC: NORMAL
CREAT SERPL-MCNC: 0.8 MG/DL (ref 0.5–1)
EOSINOPHILS ABSOLUTE: 0.2 E9/L (ref 0.05–0.5)
EOSINOPHILS RELATIVE PERCENT: 2.3 % (ref 0–6)
GFR AFRICAN AMERICAN: >60
GFR NON-AFRICAN AMERICAN: >60 ML/MIN/1.73
GLUCOSE BLD-MCNC: 83 MG/DL (ref 74–99)
GLUCOSE URINE, POC: NORMAL
HCT VFR BLD CALC: 43.3 % (ref 34–48)
HEMOGLOBIN: 14.6 G/DL (ref 11.5–15.5)
IMMATURE GRANULOCYTES #: 0.02 E9/L
IMMATURE GRANULOCYTES %: 0.2 % (ref 0–5)
KETONES, POC: NORMAL
LEUKOCYTE EST, POC: NORMAL
LIPASE: 31 U/L (ref 13–60)
LYMPHOCYTES ABSOLUTE: 2.08 E9/L (ref 1.5–4)
LYMPHOCYTES RELATIVE PERCENT: 24 % (ref 20–42)
MCH RBC QN AUTO: 29.9 PG (ref 26–35)
MCHC RBC AUTO-ENTMCNC: 33.7 % (ref 32–34.5)
MCV RBC AUTO: 88.5 FL (ref 80–99.9)
MONOCYTES ABSOLUTE: 0.52 E9/L (ref 0.1–0.95)
MONOCYTES RELATIVE PERCENT: 6 % (ref 2–12)
NEUTROPHILS ABSOLUTE: 5.79 E9/L (ref 1.8–7.3)
NEUTROPHILS RELATIVE PERCENT: 67 % (ref 43–80)
NITRITE, POC: NORMAL
PDW BLD-RTO: 12.7 FL (ref 11.5–15)
PH, POC: 8
PLATELET # BLD: 324 E9/L (ref 130–450)
PMV BLD AUTO: 10.5 FL (ref 7–12)
POTASSIUM SERPL-SCNC: 4 MMOL/L (ref 3.5–5)
PROTEIN, POC: NORMAL
RBC # BLD: 4.89 E12/L (ref 3.5–5.5)
SODIUM BLD-SCNC: 140 MMOL/L (ref 132–146)
SPECIFIC GRAVITY, POC: 1.02
TOTAL PROTEIN: 7.4 G/DL (ref 6.4–8.3)
UROBILINOGEN, POC: 0.2
WBC # BLD: 8.7 E9/L (ref 4.5–11.5)

## 2019-01-16 PROCEDURE — 83690 ASSAY OF LIPASE: CPT

## 2019-01-16 PROCEDURE — 80053 COMPREHEN METABOLIC PANEL: CPT

## 2019-01-16 PROCEDURE — 85025 COMPLETE CBC W/AUTO DIFF WBC: CPT

## 2019-01-16 PROCEDURE — 99214 OFFICE O/P EST MOD 30 MIN: CPT | Performed by: FAMILY MEDICINE

## 2019-01-16 PROCEDURE — 81002 URINALYSIS NONAUTO W/O SCOPE: CPT | Performed by: FAMILY MEDICINE

## 2019-01-16 RX ORDER — TAMSULOSIN HYDROCHLORIDE 0.4 MG/1
0.4 CAPSULE ORAL DAILY
Qty: 5 CAPSULE | Refills: 0 | Status: SHIPPED | OUTPATIENT
Start: 2019-01-16 | End: 2019-05-03

## 2019-01-16 RX ORDER — CETIRIZINE HYDROCHLORIDE 10 MG/1
10 TABLET ORAL DAILY
Qty: 30 TABLET | Refills: 5 | Status: SHIPPED | OUTPATIENT
Start: 2019-01-16 | End: 2019-10-10 | Stop reason: SDUPTHER

## 2019-01-17 LAB
ORGANISM: ABNORMAL
URINE CULTURE, ROUTINE: ABNORMAL
URINE CULTURE, ROUTINE: ABNORMAL

## 2019-05-03 ENCOUNTER — OFFICE VISIT (OUTPATIENT)
Dept: FAMILY MEDICINE CLINIC | Age: 44
End: 2019-05-03
Payer: COMMERCIAL

## 2019-05-03 VITALS
OXYGEN SATURATION: 99 % | TEMPERATURE: 97.5 F | HEIGHT: 61 IN | HEART RATE: 93 BPM | WEIGHT: 181 LBS | DIASTOLIC BLOOD PRESSURE: 88 MMHG | BODY MASS INDEX: 34.17 KG/M2 | SYSTOLIC BLOOD PRESSURE: 130 MMHG

## 2019-05-03 DIAGNOSIS — Z13.31 POSITIVE DEPRESSION SCREENING: ICD-10-CM

## 2019-05-03 DIAGNOSIS — M79.89 RIGHT AXILLARY SWELLING: ICD-10-CM

## 2019-05-03 DIAGNOSIS — F32.1 MAJOR DEPRESSIVE DISORDER, SINGLE EPISODE, MODERATE (HCC): Primary | ICD-10-CM

## 2019-05-03 PROCEDURE — G8431 POS CLIN DEPRES SCRN F/U DOC: HCPCS | Performed by: FAMILY MEDICINE

## 2019-05-03 PROCEDURE — 99214 OFFICE O/P EST MOD 30 MIN: CPT | Performed by: FAMILY MEDICINE

## 2019-05-03 PROCEDURE — G0444 DEPRESSION SCREEN ANNUAL: HCPCS | Performed by: FAMILY MEDICINE

## 2019-05-03 RX ORDER — FLUOXETINE HYDROCHLORIDE 20 MG/1
20 CAPSULE ORAL DAILY
Qty: 30 CAPSULE | Refills: 1 | Status: SHIPPED | OUTPATIENT
Start: 2019-05-03 | End: 2019-06-04 | Stop reason: SDUPTHER

## 2019-05-03 ASSESSMENT — PATIENT HEALTH QUESTIONNAIRE - PHQ9
3. TROUBLE FALLING OR STAYING ASLEEP: 2
8. MOVING OR SPEAKING SO SLOWLY THAT OTHER PEOPLE COULD HAVE NOTICED. OR THE OPPOSITE, BEING SO FIGETY OR RESTLESS THAT YOU HAVE BEEN MOVING AROUND A LOT MORE THAN USUAL: 0
2. FEELING DOWN, DEPRESSED OR HOPELESS: 2
6. FEELING BAD ABOUT YOURSELF - OR THAT YOU ARE A FAILURE OR HAVE LET YOURSELF OR YOUR FAMILY DOWN: 3
4. FEELING TIRED OR HAVING LITTLE ENERGY: 3
1. LITTLE INTEREST OR PLEASURE IN DOING THINGS: 1
SUM OF ALL RESPONSES TO PHQ QUESTIONS 1-9: 18
SUM OF ALL RESPONSES TO PHQ9 QUESTIONS 1 & 2: 3
SUM OF ALL RESPONSES TO PHQ QUESTIONS 1-9: 18
7. TROUBLE CONCENTRATING ON THINGS, SUCH AS READING THE NEWSPAPER OR WATCHING TELEVISION: 3
5. POOR APPETITE OR OVEREATING: 2
10. IF YOU CHECKED OFF ANY PROBLEMS, HOW DIFFICULT HAVE THESE PROBLEMS MADE IT FOR YOU TO DO YOUR WORK, TAKE CARE OF THINGS AT HOME, OR GET ALONG WITH OTHER PEOPLE: 2
9. THOUGHTS THAT YOU WOULD BE BETTER OFF DEAD, OR OF HURTING YOURSELF: 2

## 2019-05-03 NOTE — PROGRESS NOTES
[facility]  Office Progress Note - Dr. Farhana Owens  5/3/19    CC:   Chief Complaint   Patient presents with    Fatigue    Arm Pain     right armpit pain    Medication Refill        S: Mood  Sleeping  Crying  Stresses with son - 15 yo was refusing to go to school, out of control behavior, got into counseling, violent, holes in the walls, admitted to [address]. Dx depression and anxiety. Has been treated and improving. Still disrespect, defiance, rule following issues. Some thoughts that death would be easier, but no plans to harm self or others. hasnt been thinking anything about that. Just tired. Concerned why she isnt feeling better. + FH of mood disorders   Weight gain with eating. Stresses her as well. Also will get excited about things, get ready to do them, and then not follow through. (Even drove to another town fairly far away for an event, and then didn't go once she got nearby.)    Sx have been ongoing for about 5 months. Some strain with  over son and emotions, doesn't feel unsafe. Son has been in counseling and pt thinking about doing this as well. She has noted right armpit fullness and pain sensation for a month or so. Hx of proven fibroadenoma of right breast in the past, no other concering lesions on most recent mammo 7/2018. No palpable LAD on axillary exam today. No fevers, nightsweats, constitutional symptoms.        Past Medical History:   Diagnosis Date    Arthritis     Asthma     Diarrhea     Diverticulosis     GERD (gastroesophageal reflux disease)     Stomach pain, controlled with omeprazole    Hyperlipidemia     not on any medications    Ocular migraine     PONV (postoperative nausea and vomiting)        Family History   Problem Relation Age of Onset    Other Mother 29        passed of brain aneurysm    High Blood Pressure Father     Other Maternal Grandfather         COPD    Cancer Paternal Grandmother 31        colon    Heart Disease Paternal Grandfather 61       Past Surgical History:   Procedure Laterality Date    ADENOIDECTOMY  0     SECTION     Francisco Waterman    HYSTERECTOMY, TOTAL ABDOMINAL      PA COLONOSCOPY FLX DX W/COLLJ [address] [facility] [address] WHEN PFRMD N/A 2018    COLONOSCOPY DIAGNOSTIC performed by Carmelo Colon MD at [address] SURGERY  2015    L4 or L5 Microdiscectomy    UPPER GASTROINTESTINAL ENDOSCOPY      resulted with alkaline reflux, Dr. Светлана Monte History     Tobacco Use    Smoking status: Never Smoker    Smokeless tobacco: Never Used   Substance Use Topics    Alcohol use: Yes     Alcohol/week: 1.2 oz     Types: 2 Glasses of wine per week     Comment: social    Drug use: No       ROS:  No CP, No palpitations,   No sob, No cough,   No abd pain, No heartburn,   No headaches,   No tingling, No numbness, No weakness,   No bowel changes, No hematochezia, No melena,  No bladder changes, No hematuria  No skin rashes, No skin lesions. No vision changes, No hearing changes,   No polyuria, polydipsia, polyphagia. Depressed mood. ROS otherwise negative unless as listed in HPI. Chart reviewed and updated where appropriate for PMH, Fam, and Soc Hx. Physical Exam   /88 (Site: Right Upper Arm, Position: Sitting, Cuff Size: Large Adult)   Pulse 93   Temp 97.5 °F (36.4 °C) (Oral)   Ht 5' 1\" (1.549 m)   Wt 181 lb (82.1 kg)   LMP  (Approximate)   SpO2 99%   BMI 34.20 kg/m²   Wt Readings from Last 3 Encounters:   19 181 lb (82.1 kg)   19 180 lb (81.6 kg)   10/22/18 177 lb (80.3 kg)       Constitutional:    She is oriented to person, place, and time. She appears well-developed and well-nourished. HENT:    Nose: Nose normal.    Mouth/Throat: Oropharynx is clear and moist.   Eyes:    Conjunctivae are normal.    Pupils are equal, round, and reactive to light. EOMI. Neck:    Normal range of motion.     No thyromegaly or new Dx.   -     US Nonvascular Trunk Scan; Future  Will check for enlarged or abnormally numerous LNs. If present then CT chest. Don't think warrants exposure to radiation right now. Return in about 1 month (around 6/3/2019). Patient counseled to follow up sooner or seek more acute care if symptoms worsening. Electronically signed by Linsey Donnelly MD on 5/7/2019    This note may have been created using dictation software. Efforts were made to reduce grammatical or syntax errors, but some may persist.   On the basis of positive PHQ-9 screening (PHQ-9 Total Score: 18), the following plan was implemented: start prozac. Patient will follow-up in 1 month(s) with PCP.

## 2019-05-14 DIAGNOSIS — M79.89 RIGHT AXILLARY SWELLING: ICD-10-CM

## 2019-05-15 ENCOUNTER — TELEPHONE (OUTPATIENT)
Dept: FAMILY MEDICINE CLINIC | Age: 44
End: 2019-05-15

## 2019-05-15 DIAGNOSIS — R59.0 AXILLARY LYMPHADENOPATHY: Primary | ICD-10-CM

## 2019-05-16 NOTE — TELEPHONE ENCOUNTER
Left message for patient to go back to go over results. She has some scattered moderately enlarged lymph nodes in the right armpit. It is unclear if this is of any significant concern. She could have a CT of her chest done to see if there are any abnormalities noted. I think the chances of this are low, but to be most safe, this testing could be done. Alternatively, we could continue to monitor and have an ultrasound repeated in a month or 2 to see if there is any change.

## 2019-05-16 NOTE — TELEPHONE ENCOUNTER
Spoke with patient, verbalizes understanding. She would like to proceed with a CT scan. She would like to go to [place]. Please order.

## 2019-05-28 DIAGNOSIS — R59.0 AXILLARY LYMPHADENOPATHY: ICD-10-CM

## 2019-06-04 ENCOUNTER — OFFICE VISIT (OUTPATIENT)
Dept: FAMILY MEDICINE CLINIC | Age: 44
End: 2019-06-04
Payer: COMMERCIAL

## 2019-06-04 VITALS
DIASTOLIC BLOOD PRESSURE: 72 MMHG | SYSTOLIC BLOOD PRESSURE: 120 MMHG | TEMPERATURE: 97.4 F | WEIGHT: 177 LBS | HEIGHT: 61 IN | OXYGEN SATURATION: 98 % | HEART RATE: 96 BPM | BODY MASS INDEX: 33.42 KG/M2

## 2019-06-04 DIAGNOSIS — F32.1 MAJOR DEPRESSIVE DISORDER, SINGLE EPISODE, MODERATE (HCC): ICD-10-CM

## 2019-06-04 PROCEDURE — 99213 OFFICE O/P EST LOW 20 MIN: CPT | Performed by: FAMILY MEDICINE

## 2019-06-04 RX ORDER — FLUOXETINE HYDROCHLORIDE 20 MG/1
20 CAPSULE ORAL DAILY
Qty: 30 CAPSULE | Refills: 5 | Status: SHIPPED
Start: 2019-06-04 | End: 2020-02-27 | Stop reason: SDUPTHER

## 2019-06-04 RX ORDER — OMEPRAZOLE 20 MG/1
20 CAPSULE, DELAYED RELEASE ORAL DAILY
Qty: 30 CAPSULE | Refills: 2 | Status: SHIPPED
Start: 2019-06-04 | End: 2020-02-27 | Stop reason: SDUPTHER

## 2019-08-16 ENCOUNTER — TELEPHONE (OUTPATIENT)
Dept: ADMINISTRATIVE | Age: 44
End: 2019-08-16

## 2019-08-16 DIAGNOSIS — Z12.31 ENCOUNTER FOR MAMMOGRAM TO ESTABLISH BASELINE MAMMOGRAM: Primary | ICD-10-CM

## 2019-08-27 DIAGNOSIS — Z12.31 ENCOUNTER FOR MAMMOGRAM TO ESTABLISH BASELINE MAMMOGRAM: ICD-10-CM

## 2019-10-10 DIAGNOSIS — J30.1 CHRONIC SEASONAL ALLERGIC RHINITIS DUE TO POLLEN: ICD-10-CM

## 2019-10-10 RX ORDER — CETIRIZINE HYDROCHLORIDE 10 MG/1
10 TABLET ORAL DAILY
Qty: 30 TABLET | Refills: 5 | Status: SHIPPED
Start: 2019-10-10 | End: 2020-02-27 | Stop reason: SDUPTHER

## 2019-10-17 ENCOUNTER — TELEPHONE (OUTPATIENT)
Dept: FAMILY MEDICINE CLINIC | Age: 44
End: 2019-10-17

## 2019-11-11 ENCOUNTER — OFFICE VISIT (OUTPATIENT)
Dept: FAMILY MEDICINE CLINIC | Age: 44
End: 2019-11-11
Payer: COMMERCIAL

## 2019-11-11 ENCOUNTER — HOSPITAL ENCOUNTER (OUTPATIENT)
Age: 44
Discharge: HOME | End: 2019-11-13
Payer: COMMERCIAL

## 2019-11-11 VITALS
TEMPERATURE: 98.5 F | DIASTOLIC BLOOD PRESSURE: 60 MMHG | OXYGEN SATURATION: 98 % | WEIGHT: 177.06 LBS | HEART RATE: 84 BPM | HEIGHT: 61 IN | BODY MASS INDEX: 33.43 KG/M2 | SYSTOLIC BLOOD PRESSURE: 120 MMHG

## 2019-11-11 DIAGNOSIS — R30.0 DYSURIA: Primary | ICD-10-CM

## 2019-11-11 DIAGNOSIS — N39.0 URINARY TRACT INFECTION WITHOUT HEMATURIA, SITE UNSPECIFIED: ICD-10-CM

## 2019-11-11 DIAGNOSIS — R30.0 DYSURIA: ICD-10-CM

## 2019-11-11 LAB
BILIRUBIN, POC: NEGATIVE
BLOOD URINE, POC: ABNORMAL
CLARITY, POC: ABNORMAL
COLOR, POC: YELLOW
GLUCOSE URINE, POC: NEGATIVE
KETONES, POC: NEGATIVE
LEUKOCYTE EST, POC: NEGATIVE
NITRITE, POC: NEGATIVE
PH, POC: 7
PROTEIN, POC: NEGATIVE
SPECIFIC GRAVITY, POC: 1.01
UROBILINOGEN, POC: 0.2

## 2019-11-11 PROCEDURE — 99213 OFFICE O/P EST LOW 20 MIN: CPT | Performed by: FAMILY MEDICINE

## 2019-11-11 PROCEDURE — 81002 URINALYSIS NONAUTO W/O SCOPE: CPT | Performed by: FAMILY MEDICINE

## 2019-11-11 PROCEDURE — 87088 URINE BACTERIA CULTURE: CPT

## 2019-11-11 RX ORDER — CHOLECALCIFEROL (VITAMIN D3) 10(400)/ML
DROPS ORAL
COMMUNITY
End: 2019-12-16

## 2019-11-11 RX ORDER — NITROFURANTOIN 25; 75 MG/1; MG/1
100 CAPSULE ORAL 2 TIMES DAILY
Qty: 20 CAPSULE | Refills: 0 | Status: SHIPPED | OUTPATIENT
Start: 2019-11-11 | End: 2019-11-21

## 2019-11-11 ASSESSMENT — ENCOUNTER SYMPTOMS
PHOTOPHOBIA: 0
EYE REDNESS: 0
DIARRHEA: 0
SHORTNESS OF BREATH: 0
COUGH: 0
SORE THROAT: 0
VOMITING: 0
EYE DISCHARGE: 0
NAUSEA: 0
CHEST TIGHTNESS: 0
ALLERGIC/IMMUNOLOGIC NEGATIVE: 1
SINUS PAIN: 0
BLOOD IN STOOL: 0
ABDOMINAL PAIN: 0
EYE PAIN: 0
TROUBLE SWALLOWING: 0
BACK PAIN: 1

## 2019-11-14 LAB — URINE CULTURE, ROUTINE: NORMAL

## 2019-11-26 ENCOUNTER — TELEPHONE (OUTPATIENT)
Dept: FAMILY MEDICINE CLINIC | Age: 44
End: 2019-11-26

## 2019-11-26 ENCOUNTER — NURSE ONLY (OUTPATIENT)
Dept: FAMILY MEDICINE CLINIC | Age: 44
End: 2019-11-26
Payer: COMMERCIAL

## 2019-11-26 DIAGNOSIS — R10.9 RIGHT FLANK PAIN: Primary | ICD-10-CM

## 2019-11-26 DIAGNOSIS — R31.9 HEMATURIA, UNSPECIFIED TYPE: ICD-10-CM

## 2019-11-26 DIAGNOSIS — R30.0 DYSURIA: Primary | ICD-10-CM

## 2019-11-26 LAB
BILIRUBIN, POC: NORMAL
BLOOD URINE, POC: NORMAL
CLARITY, POC: CLEAR
COLOR, POC: YELLOW
GLUCOSE URINE, POC: NORMAL
KETONES, POC: NORMAL
LEUKOCYTE EST, POC: NORMAL
NITRITE, POC: NORMAL
PH, POC: 7
PROTEIN, POC: NORMAL
SPECIFIC GRAVITY, POC: 1.02
UROBILINOGEN, POC: 0.2

## 2019-11-26 PROCEDURE — 81002 URINALYSIS NONAUTO W/O SCOPE: CPT | Performed by: FAMILY MEDICINE

## 2019-11-27 ENCOUNTER — TELEPHONE (OUTPATIENT)
Dept: FAMILY MEDICINE CLINIC | Age: 44
End: 2019-11-27

## 2019-11-27 DIAGNOSIS — R10.9 RIGHT FLANK PAIN: Primary | ICD-10-CM

## 2019-11-27 DIAGNOSIS — R31.9 HEMATURIA, UNSPECIFIED TYPE: ICD-10-CM

## 2019-12-03 ENCOUNTER — TELEPHONE (OUTPATIENT)
Dept: FAMILY MEDICINE CLINIC | Age: 44
End: 2019-12-03

## 2019-12-03 DIAGNOSIS — R31.9 HEMATURIA, UNSPECIFIED TYPE: ICD-10-CM

## 2019-12-03 DIAGNOSIS — R10.9 RIGHT FLANK PAIN: Primary | ICD-10-CM

## 2019-12-16 ENCOUNTER — TELEPHONE (OUTPATIENT)
Dept: CHIROPRACTIC MEDICINE | Age: 44
End: 2019-12-16

## 2019-12-16 ENCOUNTER — OFFICE VISIT (OUTPATIENT)
Dept: FAMILY MEDICINE CLINIC | Age: 44
End: 2019-12-16
Payer: COMMERCIAL

## 2019-12-16 VITALS
WEIGHT: 179 LBS | TEMPERATURE: 98.2 F | HEIGHT: 61 IN | HEART RATE: 69 BPM | OXYGEN SATURATION: 98 % | DIASTOLIC BLOOD PRESSURE: 82 MMHG | SYSTOLIC BLOOD PRESSURE: 130 MMHG | BODY MASS INDEX: 33.79 KG/M2

## 2019-12-16 DIAGNOSIS — S16.1XXA STRAIN OF NECK MUSCLE, INITIAL ENCOUNTER: Primary | ICD-10-CM

## 2019-12-16 PROCEDURE — 99214 OFFICE O/P EST MOD 30 MIN: CPT | Performed by: FAMILY MEDICINE

## 2019-12-16 RX ORDER — TIZANIDINE 4 MG/1
4 TABLET ORAL DAILY
Qty: 10 TABLET | Refills: 0 | Status: SHIPPED
Start: 2019-12-16 | End: 2020-02-27

## 2019-12-16 RX ORDER — NAPROXEN 500 MG/1
500 TABLET ORAL 2 TIMES DAILY WITH MEALS
Qty: 20 TABLET | Refills: 0 | Status: SHIPPED
Start: 2019-12-16 | End: 2020-02-27

## 2019-12-16 ASSESSMENT — ENCOUNTER SYMPTOMS
EYE PAIN: 0
CHEST TIGHTNESS: 0
ABDOMINAL PAIN: 0
EYE DISCHARGE: 0
SHORTNESS OF BREATH: 0
COUGH: 0
SINUS PAIN: 0
VOMITING: 0
ALLERGIC/IMMUNOLOGIC NEGATIVE: 1
BACK PAIN: 0
TROUBLE SWALLOWING: 0
PHOTOPHOBIA: 0
DIARRHEA: 0
SORE THROAT: 0
BLOOD IN STOOL: 0
EYE REDNESS: 0
NAUSEA: 0

## 2019-12-16 NOTE — TELEPHONE ENCOUNTER
Pt is calling for neck pain, can't turn head to left x 5 days. Has not been seen in the last 3 months would need a 30 min appt, none available. Please call pt with any recommendations. [phone]

## 2019-12-19 ENCOUNTER — OFFICE VISIT (OUTPATIENT)
Dept: CHIROPRACTIC MEDICINE | Age: 44
End: 2019-12-19
Payer: COMMERCIAL

## 2019-12-19 ENCOUNTER — TELEPHONE (OUTPATIENT)
Dept: FAMILY MEDICINE CLINIC | Age: 44
End: 2019-12-19

## 2019-12-19 VITALS
HEART RATE: 93 BPM | SYSTOLIC BLOOD PRESSURE: 132 MMHG | TEMPERATURE: 97.5 F | DIASTOLIC BLOOD PRESSURE: 86 MMHG | OXYGEN SATURATION: 98 %

## 2019-12-19 DIAGNOSIS — M62.830 MUSCLE SPASM OF BACK: ICD-10-CM

## 2019-12-19 DIAGNOSIS — M54.04 PANNICULITIS AFFECTING REGIONS OF NECK AND BACK, THORACIC REGION: ICD-10-CM

## 2019-12-19 DIAGNOSIS — M54.2 CERVICALGIA: Primary | ICD-10-CM

## 2019-12-19 PROCEDURE — 99213 OFFICE O/P EST LOW 20 MIN: CPT | Performed by: CHIROPRACTOR

## 2019-12-19 PROCEDURE — 97014 ELECTRIC STIMULATION THERAPY: CPT | Performed by: CHIROPRACTOR

## 2019-12-19 PROCEDURE — 98940 CHIROPRACT MANJ 1-2 REGIONS: CPT | Performed by: CHIROPRACTOR

## 2020-02-27 ENCOUNTER — OFFICE VISIT (OUTPATIENT)
Dept: FAMILY MEDICINE CLINIC | Age: 45
End: 2020-02-27
Payer: COMMERCIAL

## 2020-02-27 ENCOUNTER — HOSPITAL ENCOUNTER (OUTPATIENT)
Age: 45
Discharge: HOME | End: 2020-02-29
Payer: COMMERCIAL

## 2020-02-27 VITALS
DIASTOLIC BLOOD PRESSURE: 78 MMHG | TEMPERATURE: 97.6 F | BODY MASS INDEX: 34.55 KG/M2 | WEIGHT: 183 LBS | SYSTOLIC BLOOD PRESSURE: 136 MMHG | OXYGEN SATURATION: 98 % | HEIGHT: 61 IN | HEART RATE: 88 BPM

## 2020-02-27 LAB
BACTERIA: ABNORMAL /HPF
BILIRUBIN URINE: NEGATIVE
BLOOD, URINE: ABNORMAL
CLARITY: CLEAR
COLOR: YELLOW
EPITHELIAL CELLS, UA: ABNORMAL /HPF
GLUCOSE URINE: NEGATIVE MG/DL
KETONES, URINE: NEGATIVE MG/DL
LEUKOCYTE ESTERASE, URINE: NEGATIVE
NITRITE, URINE: NEGATIVE
PH UA: 7 (ref 5–9)
PROTEIN UA: NEGATIVE MG/DL
RBC UA: ABNORMAL /HPF (ref 0–2)
SPECIFIC GRAVITY UA: 1.02 (ref 1–1.03)
UROBILINOGEN, URINE: 0.2 E.U./DL
WBC UA: ABNORMAL /HPF (ref 0–5)

## 2020-02-27 PROCEDURE — 81001 URINALYSIS AUTO W/SCOPE: CPT

## 2020-02-27 PROCEDURE — 99214 OFFICE O/P EST MOD 30 MIN: CPT | Performed by: FAMILY MEDICINE

## 2020-02-27 RX ORDER — CETIRIZINE HYDROCHLORIDE 10 MG/1
10 TABLET ORAL DAILY
Qty: 30 TABLET | Refills: 5 | Status: SHIPPED
Start: 2020-02-27 | End: 2020-06-12 | Stop reason: SDUPTHER

## 2020-02-27 RX ORDER — FLUOXETINE HYDROCHLORIDE 20 MG/1
20 CAPSULE ORAL DAILY
Qty: 30 CAPSULE | Refills: 5 | Status: SHIPPED
Start: 2020-02-27 | End: 2020-09-21

## 2020-02-27 RX ORDER — OMEPRAZOLE 20 MG/1
20 CAPSULE, DELAYED RELEASE ORAL DAILY
Qty: 30 CAPSULE | Refills: 2 | Status: SHIPPED
Start: 2020-02-27 | End: 2020-03-30

## 2020-02-27 ASSESSMENT — PATIENT HEALTH QUESTIONNAIRE - PHQ9
2. FEELING DOWN, DEPRESSED OR HOPELESS: 0
SUM OF ALL RESPONSES TO PHQ QUESTIONS 1-9: 0
SUM OF ALL RESPONSES TO PHQ9 QUESTIONS 1 & 2: 0
1. LITTLE INTEREST OR PLEASURE IN DOING THINGS: 0
SUM OF ALL RESPONSES TO PHQ QUESTIONS 1-9: 0

## 2020-02-27 NOTE — PROGRESS NOTES
[facility]  Office Progress Note - Dr. Nieves Garza  20    CC:   Chief Complaint   Patient presents with    Fatigue    Finger Pain     [place] Alexus in 2019, hurt left pinky finger        S: Had a fall in November  Hurt her left 5th digit  Wont stop hurting. Often swollen in the morning   Sensitive. Dec opposition strength. GERD  She was also waking at night with a throat clearing or swallowing feeling. Occ cough  Noted she as off omeprazole for quite some time. She was assuming she was having sinus drainage. She started the omeprazole back over the counter and she has had improvement in symptoms. Rarely she will have some daytime symptoms of reflux but without heartburn. She is sleeping better over the past 6 days since restarting therapy. Mood  Has been good lately  She decreased to prozac QOD for a few months. Wants to hold steady there. No SE noted. Right flank pain  Long term, probably years. Considered stone before, but never got imaging done. Discussed some options. No worsening. No urinary changes. Decided to get urine sample and micro eval before radio images. Allergies  Usually year round. Continues Histamine blockers. Helps.      Past Medical History:   Diagnosis Date    Arthritis     Asthma     Diarrhea     Diverticulosis     GERD (gastroesophageal reflux disease)     Stomach pain, controlled with omeprazole    Hyperlipidemia     not on any medications    Ocular migraine     PONV (postoperative nausea and vomiting)        Family History   Problem Relation Age of Onset    Other Mother 29        passed of brain aneurysm    High Blood Pressure Father     Other Maternal Grandfather         COPD    Cancer Paternal Grandmother 28        colon    Heart Disease Paternal Grandfather 61       Past Surgical History:   Procedure Laterality Date    ADENOIDECTOMY  0     SECTION     Robin Quijano

## 2020-03-02 ENCOUNTER — PATIENT MESSAGE (OUTPATIENT)
Dept: FAMILY MEDICINE CLINIC | Age: 45
End: 2020-03-02

## 2020-03-02 ENCOUNTER — HOSPITAL ENCOUNTER (OUTPATIENT)
Age: 45
Discharge: HOME | End: 2020-03-04
Payer: COMMERCIAL

## 2020-03-02 LAB
ALBUMIN SERPL-MCNC: 4 G/DL (ref 3.5–5.2)
ALP BLD-CCNC: 61 U/L (ref 35–104)
ALT SERPL-CCNC: 12 U/L (ref 0–32)
ANION GAP SERPL CALCULATED.3IONS-SCNC: 12 MMOL/L (ref 7–16)
AST SERPL-CCNC: 17 U/L (ref 0–31)
BASOPHILS ABSOLUTE: 0.03 E9/L (ref 0–0.2)
BASOPHILS RELATIVE PERCENT: 0.4 % (ref 0–2)
BILIRUB SERPL-MCNC: 0.4 MG/DL (ref 0–1.2)
BUN BLDV-MCNC: 12 MG/DL (ref 6–20)
CALCIUM SERPL-MCNC: 9.3 MG/DL (ref 8.6–10.2)
CHLORIDE BLD-SCNC: 102 MMOL/L (ref 98–107)
CHOLESTEROL, TOTAL: 267 MG/DL (ref 0–199)
CO2: 24 MMOL/L (ref 22–29)
CREAT SERPL-MCNC: 0.8 MG/DL (ref 0.5–1)
EOSINOPHILS ABSOLUTE: 0.24 E9/L (ref 0.05–0.5)
EOSINOPHILS RELATIVE PERCENT: 3.3 % (ref 0–6)
GFR AFRICAN AMERICAN: >60
GFR NON-AFRICAN AMERICAN: >60 ML/MIN/1.73
GLUCOSE BLD-MCNC: 98 MG/DL (ref 74–99)
HCT VFR BLD CALC: 42.6 % (ref 34–48)
HDLC SERPL-MCNC: 54 MG/DL
HEMOGLOBIN: 13.6 G/DL (ref 11.5–15.5)
IMMATURE GRANULOCYTES #: 0.02 E9/L
IMMATURE GRANULOCYTES %: 0.3 % (ref 0–5)
LDL CHOLESTEROL CALCULATED: 182 MG/DL (ref 0–99)
LYMPHOCYTES ABSOLUTE: 1.84 E9/L (ref 1.5–4)
LYMPHOCYTES RELATIVE PERCENT: 25.1 % (ref 20–42)
MCH RBC QN AUTO: 28.8 PG (ref 26–35)
MCHC RBC AUTO-ENTMCNC: 31.9 % (ref 32–34.5)
MCV RBC AUTO: 90.1 FL (ref 80–99.9)
MONOCYTES ABSOLUTE: 0.51 E9/L (ref 0.1–0.95)
MONOCYTES RELATIVE PERCENT: 7 % (ref 2–12)
NEUTROPHILS ABSOLUTE: 4.68 E9/L (ref 1.8–7.3)
NEUTROPHILS RELATIVE PERCENT: 63.9 % (ref 43–80)
PDW BLD-RTO: 13.2 FL (ref 11.5–15)
PLATELET # BLD: 313 E9/L (ref 130–450)
PMV BLD AUTO: 10.4 FL (ref 7–12)
POTASSIUM SERPL-SCNC: 4 MMOL/L (ref 3.5–5)
RBC # BLD: 4.73 E12/L (ref 3.5–5.5)
SODIUM BLD-SCNC: 138 MMOL/L (ref 132–146)
TOTAL PROTEIN: 7.2 G/DL (ref 6.4–8.3)
TRIGL SERPL-MCNC: 156 MG/DL (ref 0–149)
VLDLC SERPL CALC-MCNC: 31 MG/DL
WBC # BLD: 7.3 E9/L (ref 4.5–11.5)

## 2020-03-02 PROCEDURE — 80061 LIPID PANEL: CPT

## 2020-03-02 PROCEDURE — 80053 COMPREHEN METABOLIC PANEL: CPT

## 2020-03-02 PROCEDURE — 36415 COLL VENOUS BLD VENIPUNCTURE: CPT

## 2020-03-02 PROCEDURE — 85025 COMPLETE CBC W/AUTO DIFF WBC: CPT

## 2020-03-12 ENCOUNTER — HOSPITAL ENCOUNTER (OUTPATIENT)
Dept: ULTRASOUND IMAGING | Age: 45
Discharge: HOME | End: 2020-03-14
Payer: COMMERCIAL

## 2020-03-12 PROCEDURE — 76770 US EXAM ABDO BACK WALL COMP: CPT

## 2020-03-28 ENCOUNTER — PATIENT MESSAGE (OUTPATIENT)
Dept: FAMILY MEDICINE CLINIC | Age: 45
End: 2020-03-28

## 2020-03-30 RX ORDER — OMEPRAZOLE 40 MG/1
40 CAPSULE, DELAYED RELEASE ORAL DAILY
Qty: 30 CAPSULE | Refills: 0 | Status: SHIPPED
Start: 2020-03-30 | End: 2020-04-23

## 2020-04-23 RX ORDER — OMEPRAZOLE 40 MG/1
CAPSULE, DELAYED RELEASE ORAL
Qty: 30 CAPSULE | Refills: 0 | Status: SHIPPED
Start: 2020-04-23 | End: 2020-06-12

## 2020-06-06 ENCOUNTER — PATIENT MESSAGE (OUTPATIENT)
Dept: FAMILY MEDICINE CLINIC | Age: 45
End: 2020-06-06

## 2020-06-09 NOTE — TELEPHONE ENCOUNTER
Spoke with patient, she states that her knee is still a bit sore but leg is less swollen. Pt plans to come to [facility] office tomorrow for [address]. (she is working today)  Please place US order. Pt advised to call [facility] office to get on [address] [facility] schedule.

## 2020-06-12 ENCOUNTER — OFFICE VISIT (OUTPATIENT)
Dept: FAMILY MEDICINE CLINIC | Age: 45
End: 2020-06-12
Payer: COMMERCIAL

## 2020-06-12 VITALS
DIASTOLIC BLOOD PRESSURE: 80 MMHG | OXYGEN SATURATION: 99 % | TEMPERATURE: 98.2 F | SYSTOLIC BLOOD PRESSURE: 126 MMHG | HEIGHT: 61 IN | WEIGHT: 186 LBS | HEART RATE: 88 BPM | BODY MASS INDEX: 35.12 KG/M2

## 2020-06-12 PROCEDURE — 99213 OFFICE O/P EST LOW 20 MIN: CPT | Performed by: FAMILY MEDICINE

## 2020-06-12 RX ORDER — CETIRIZINE HYDROCHLORIDE 10 MG/1
10 TABLET ORAL DAILY
Qty: 30 TABLET | Refills: 5 | Status: SHIPPED
Start: 2020-06-12 | End: 2020-12-15

## 2020-06-12 RX ORDER — OMEPRAZOLE 20 MG/1
CAPSULE, DELAYED RELEASE ORAL
COMMUNITY
Start: 2020-04-23 | End: 2020-11-23 | Stop reason: SDUPTHER

## 2020-06-12 RX ORDER — FLUTICASONE PROPIONATE 50 MCG
2 SPRAY, SUSPENSION (ML) NASAL DAILY
Qty: 1 BOTTLE | Refills: 2 | Status: SHIPPED | OUTPATIENT
Start: 2020-06-12

## 2020-06-12 NOTE — PATIENT INSTRUCTIONS
if:  · You have new or worse pain. · Your foot is cool or pale or changes color. · You have tingling, weakness, or numbness in your foot or toes. · You have signs of a blood clot in your leg (called a deep vein thrombosis), such as:  ? Pain in your calf, back of the knee, thigh, or groin. ? Redness or swelling in your leg. Watch closely for changes in your health, and be sure to contact your doctor if:  · You do not get better as expected. Where can you learn more? Go to https://VidapppeZedmo.Animal Innovations. org and sign in to your CoachMePlus account. Enter I184 in the Managed Objects box to learn more about \"Baker's Cyst: Care Instructions. \"     If you do not have an account, please click on the \"Sign Up Now\" link. Current as of: March 2, 2020               Content Version: 12.5  © 1326-7831 Healthwise, Incorporated. Care instructions adapted under license by [facility] ([facility]). If you have questions about a medical condition or this instruction, always ask your healthcare professional. [place] [address] any warranty or liability for your use of this information.

## 2020-06-12 NOTE — PROGRESS NOTES
[facility]  Office Progress Note - Dr. Len Roth  6/12/20    CC:   Chief Complaint   Patient presents with    Joint Pain     Right knee, Right elbow, Left hand    Leg Swelling     Right leg swelling    Discuss Medications     Pt requesting order for flonase        HPI:    Omeprazole increase didn't seem to help with reflux or drainage symptoms at night. She went and tried flonase and that did seem to help and reduced down to 20mg of omeprazole. Overall improved. She has been noting right knee pain  2 weeks or so. She noted some swelling and pain so we got an ultrasound that was negative for DVT. Feels full. Notes posterior knee pain. Worse when she crouches or flexes the knee fully. On exam she has a fullness laterally, probably a Baker's cyst and this is the point of tenderness. Right MCP pain for about a month  No specific injury noted. Focal point tenderness between the third and fourth digits MCP space. No palpable abnormality. Seems to be improving some over time. She had an x-ray of her fifth digit on his hand earlier in the year which did not show any erosions or osteoarthritic changes. Right elbow has been bothering as well. She has not tried any medications for this. She has worn a brace in the past for some tennis elbow.       _________________________________________________________  Past Medical History:   Diagnosis Date    Arthritis     Asthma     Diarrhea     Diverticulosis     GERD (gastroesophageal reflux disease)     Stomach pain, controlled with omeprazole    Hyperlipidemia     not on any medications    Ocular migraine     PONV (postoperative nausea and vomiting)        Family History   Problem Relation Age of Onset    Other Mother 29        passed of brain aneurysm    High Blood Pressure Father     Other Maternal Grandfather         COPD    Cancer Paternal Grandmother 28        colon    Heart Disease Paternal Grandfather 61

## 2020-06-23 ENCOUNTER — PATIENT MESSAGE (OUTPATIENT)
Dept: FAMILY MEDICINE CLINIC | Age: 45
End: 2020-06-23

## 2020-06-23 NOTE — TELEPHONE ENCOUNTER
From: Yola Sánchez  To: Izabel Martinez MD  Sent: 6/23/2020 7:36 AM EDT  Subject: Visit Follow-Up Question    I would like to see an orthopedics doctor, as we had discussed, about my knee. If you can please send a referral to [address] [place] [address], Dr Cammy Stapleton.  Thank you

## 2020-09-21 ENCOUNTER — TELEPHONE (OUTPATIENT)
Dept: ADMINISTRATIVE | Age: 45
End: 2020-09-21

## 2020-09-21 RX ORDER — FLUOXETINE HYDROCHLORIDE 20 MG/1
CAPSULE ORAL
Qty: 30 CAPSULE | Refills: 5 | Status: SHIPPED
Start: 2020-09-21 | End: 2021-02-05

## 2020-09-21 NOTE — TELEPHONE ENCOUNTER
Pt had knee surgery last week, BP was 155/108 and she was told to see her PCP for elevated BP, they thought she needed on meds. There are no openings for 2 weeks. Please contact pt with further instructions and/or an appt.

## 2020-09-22 NOTE — TELEPHONE ENCOUNTER
Can schedule appt for next available. Can bring in for nursing visit to check pressure in the meantime. If has or gets BP cuff can keep an eye on it at home as well. Might have just been high bc of the surgery / anxiety about surgery.

## 2020-10-20 ENCOUNTER — APPOINTMENT (OUTPATIENT)
Dept: GENERAL RADIOLOGY | Age: 45
End: 2020-10-20
Payer: COMMERCIAL

## 2020-10-20 ENCOUNTER — TELEPHONE (OUTPATIENT)
Dept: ADMINISTRATIVE | Age: 45
End: 2020-10-20

## 2020-10-20 ENCOUNTER — NURSE TRIAGE (OUTPATIENT)
Dept: OTHER | Facility: CLINIC | Age: 45
End: 2020-10-20

## 2020-10-20 ENCOUNTER — HOSPITAL ENCOUNTER (EMERGENCY)
Age: 45
Discharge: HOME | End: 2020-10-20
Attending: EMERGENCY MEDICINE
Payer: COMMERCIAL

## 2020-10-20 VITALS
WEIGHT: 184 LBS | DIASTOLIC BLOOD PRESSURE: 78 MMHG | SYSTOLIC BLOOD PRESSURE: 143 MMHG | HEIGHT: 61 IN | OXYGEN SATURATION: 100 % | TEMPERATURE: 98.5 F | RESPIRATION RATE: 16 BRPM | HEART RATE: 71 BPM | BODY MASS INDEX: 34.74 KG/M2

## 2020-10-20 LAB
ANION GAP SERPL CALCULATED.3IONS-SCNC: 9 MMOL/L (ref 7–16)
BASOPHILS ABSOLUTE: 0.04 E9/L (ref 0–0.2)
BASOPHILS RELATIVE PERCENT: 0.5 % (ref 0–2)
BUN BLDV-MCNC: 11 MG/DL (ref 6–20)
CALCIUM SERPL-MCNC: 9.7 MG/DL (ref 8.6–10.2)
CHLORIDE BLD-SCNC: 103 MMOL/L (ref 98–107)
CO2: 26 MMOL/L (ref 22–29)
CREAT SERPL-MCNC: 0.7 MG/DL (ref 0.5–1)
D DIMER: <200 NG/ML DDU
EOSINOPHILS ABSOLUTE: 0.21 E9/L (ref 0.05–0.5)
EOSINOPHILS RELATIVE PERCENT: 2.7 % (ref 0–6)
GFR AFRICAN AMERICAN: >60
GFR NON-AFRICAN AMERICAN: >60 ML/MIN/1.73
GLUCOSE BLD-MCNC: 96 MG/DL (ref 74–99)
HCT VFR BLD CALC: 40.3 % (ref 34–48)
HEMOGLOBIN: 14 G/DL (ref 11.5–15.5)
IMMATURE GRANULOCYTES #: 0.02 E9/L
IMMATURE GRANULOCYTES %: 0.3 % (ref 0–5)
LYMPHOCYTES ABSOLUTE: 2.35 E9/L (ref 1.5–4)
LYMPHOCYTES RELATIVE PERCENT: 30.2 % (ref 20–42)
MCH RBC QN AUTO: 30.5 PG (ref 26–35)
MCHC RBC AUTO-ENTMCNC: 34.7 % (ref 32–34.5)
MCV RBC AUTO: 87.8 FL (ref 80–99.9)
MONOCYTES ABSOLUTE: 0.53 E9/L (ref 0.1–0.95)
MONOCYTES RELATIVE PERCENT: 6.8 % (ref 2–12)
NEUTROPHILS ABSOLUTE: 4.64 E9/L (ref 1.8–7.3)
NEUTROPHILS RELATIVE PERCENT: 59.5 % (ref 43–80)
PDW BLD-RTO: 12.9 FL (ref 11.5–15)
PLATELET # BLD: 304 E9/L (ref 130–450)
PMV BLD AUTO: 9.5 FL (ref 7–12)
POTASSIUM SERPL-SCNC: 3.6 MMOL/L (ref 3.5–5)
RBC # BLD: 4.59 E12/L (ref 3.5–5.5)
SODIUM BLD-SCNC: 138 MMOL/L (ref 132–146)
TROPONIN: <0.01 NG/ML (ref 0–0.03)
TROPONIN: <0.01 NG/ML (ref 0–0.03)
WBC # BLD: 7.8 E9/L (ref 4.5–11.5)

## 2020-10-20 PROCEDURE — 99284 EMERGENCY DEPT VISIT MOD MDM: CPT

## 2020-10-20 PROCEDURE — 93005 ELECTROCARDIOGRAM TRACING: CPT | Performed by: EMERGENCY MEDICINE

## 2020-10-20 PROCEDURE — 71046 X-RAY EXAM CHEST 2 VIEWS: CPT

## 2020-10-20 PROCEDURE — 85025 COMPLETE CBC W/AUTO DIFF WBC: CPT

## 2020-10-20 PROCEDURE — 84484 ASSAY OF TROPONIN QUANT: CPT

## 2020-10-20 PROCEDURE — 6370000000 HC RX 637 (ALT 250 FOR IP): Performed by: STUDENT IN AN ORGANIZED HEALTH CARE EDUCATION/TRAINING PROGRAM

## 2020-10-20 PROCEDURE — 85378 FIBRIN DEGRADE SEMIQUANT: CPT

## 2020-10-20 PROCEDURE — 80048 BASIC METABOLIC PNL TOTAL CA: CPT

## 2020-10-20 PROCEDURE — 99285 EMERGENCY DEPT VISIT HI MDM: CPT

## 2020-10-20 RX ADMIN — LIDOCAINE HYDROCHLORIDE: 20 SOLUTION ORAL; TOPICAL at 20:04

## 2020-10-20 ASSESSMENT — ENCOUNTER SYMPTOMS
COUGH: 1
BACK PAIN: 0
SINUS PRESSURE: 0
ABDOMINAL DISTENTION: 0
SORE THROAT: 0
SHORTNESS OF BREATH: 1
EYE REDNESS: 0
EYE DISCHARGE: 0
DIARRHEA: 0
EYE PAIN: 0
VOMITING: 0
HEMOPTYSIS: 0
SPUTUM PRODUCTION: 0
ABDOMINAL PAIN: 0
CHEST TIGHTNESS: 1
NAUSEA: 0
WHEEZING: 0

## 2020-10-20 NOTE — TELEPHONE ENCOUNTER
Reason for Disposition   Chest pain lasting longer than 5 minutes and ANY of the following:* Over 39years old* Over 27years old and at least one cardiac risk factor (e.g., diabetes, high blood pressure, high cholesterol, smoker, or strong family history of heart disease)* History of heart disease (i.e., angina, heart attack, heart failure, bypass surgery, takes nitroglycerin)* Pain is crushing, pressure-like, or heavy    Answer Assessment - Initial Assessment Questions  1. LOCATION: \"Where does it hurt? \"        Neck area and upper chest     2. RADIATION: \"Does the pain go anywhere else? \" (e.g., into neck, jaw, arms, back)      No    3. ONSET: \"When did the chest pain begin? \" (Minutes, hours or days)       4 days     4. PATTERN \"Does the pain come and go, or has it been constant since it started? \"  \"Does it get worse with exertion? \"       Constant \"just there. \"  Feels as if something \"around my neck\" at night. 5. DURATION: \"How long does it last\" (e.g., seconds, minutes, hours)      Constant     6. SEVERITY: \"How bad is the pain? \"  (e.g., Scale 1-10; mild, moderate, or severe)     - MILD (1-3): doesn't interfere with normal activities      - MODERATE (4-7): interferes with normal activities or awakens from sleep     - SEVERE (8-10): excruciating pain, unable to do any normal activities       Moderate rates 5/10.     7. CARDIAC RISK FACTORS: \"Do you have any history of heart problems or risk factors for heart disease? \" (e.g., angina, prior heart attack; diabetes, high blood pressure, high cholesterol, smoker, or strong family history of heart disease)      High cholesterol and Family hx    8. PULMONARY RISK FACTORS: \"Do you have any history of lung disease? \"  (e.g., blood clots in lung, asthma, emphysema, birth control pills)      No     9. CAUSE: \"What do you think is causing the chest pain? \"      Do not know    10. OTHER SYMPTOMS: \"Do you have any other symptoms? \" (e.g., dizziness, nausea, vomiting, sweating, fever, difficulty breathing, cough)       Cough, hard to swallow especially when she wakes up and feels as if throat is swollen and tight. Feels as if she cannot breathe at times. 11. PREGNANCY: \"Is there any chance you are pregnant? \" \"When was your last menstrual period? \"        hyst    Answer Assessment - Initial Assessment Questions  1. RESPIRATORY STATUS: \"Describe your breathing? \" (e.g., wheezing, shortness of breath, unable to speak, severe coughing)       No just feels tightness in neck and upper chest.     2. ONSET: \"When did this breathing problem begin? \"       Feels as if neck feels tight and cant swallow when she wakes up couple of weeks. Chest tight and sob x 4 days. 3. PATTERN \"Does the difficult breathing come and go, or has it been constant since it started? \"       Constant and yawning a lot     4. SEVERITY: \"How bad is your breathing? \" (e.g., mild, moderate, severe)     - MILD: No SOB at rest, mild SOB with walking, speaks normally in sentences, can lay down, no retractions, pulse < 100.     - MODERATE: SOB at rest, SOB with minimal exertion and prefers to sit, cannot lie down flat, speaks in phrases, mild retractions, audible wheezing, pulse 100-120.     - SEVERE: Very SOB at rest, speaks in single words, struggling to breathe, sitting hunched forward, retractions, pulse > 120       Mild-can go up stairs    5. RECURRENT SYMPTOM: \"Have you had difficulty breathing before? \" If so, ask: \"When was the last time? \" and \"What happened that time? \"       No     6. CARDIAC HISTORY: \"Do you have any history of heart disease? \" (e.g., heart attack, angina, bypass surgery, angioplasty)       No meniscus repair 1 month ago-had high blood pressure. BP now 146/96 pulse 77 on home machine. 7. LUNG HISTORY: \"Do you have any history of lung disease? \"  (e.g., pulmonary embolus, asthma, emphysema)     No-    8. CAUSE: \"What do you think is causing the breathing problem? \"       Not sure    9. OTHER SYMPTOMS: \"Do you have any other symptoms? (e.g., dizziness, runny nose, cough, chest pain, fever)      See other documentation    10. PREGNANCY: \"Is there any chance you are pregnant? \" \"When was your last menstrual period? \"        No hyst    11. TRAVEL: \"Have you traveled out of the country in the last month? \" (e.g., travel history, exposures)        No    Protocols used: CHEST PAIN-ADULT-[place], BREATHING DIFFICULTY-ADULT-[place]    POD 1 [place]    Caller reports symptoms as documented above. Caller informed of disposition to the ED. Care advice as documented. Attention Provider: Thank you for allowing me to participate in the care of your patient. The patient was connected to triage in response to information provided to the [facility]. Please do not respond through this encounter as the response is not directed to a shared pool.

## 2020-10-20 NOTE — TELEPHONE ENCOUNTER
Pt states she has been having a feeling of heaviness in her chest since this Saturday. Pt states she feels as is she cannot take a deep breath. Soft transfer to nurse triage. Physician schedule full, please advise pt.      Thank you

## 2020-10-20 NOTE — ED NOTES
Assumed care of patient. Pt lying in bed in no apparent distress.  at bedside.      Raquel Baker, CHELLE  10/20/20 [id number]

## 2020-10-20 NOTE — ED PROVIDER NOTES
Management Options  Chest pain, unspecified type:   Diagnosis management comments: Patient's labs unremarkable at this time. Patient given GI cocktail with minor improvement. She does have a heart score of 3. We will obtain delta troponins to ensure no cardiac etiology of her chest pain. Also troponins were negative. At this time there does not appear to be any emergent processes ongoing. Patient informed the results and plan and is agreeable. Patient recommended to follow-up with her PCP for further evaluation and educated on when to return to the ED for reevaluation. Patient will be discharged. Amount and/or Complexity of Data Reviewed  Clinical lab tests: reviewed  Tests in the radiology section of CPT®: reviewed  Tests in the medicine section of CPT®: reviewed              --------------------------------------------- PAST HISTORY ---------------------------------------------  Past Medical History:  has a past medical history of Arthritis, Asthma, Diarrhea, Diverticulosis, GERD (gastroesophageal reflux disease), Hyperlipidemia, Ocular migraine, and PONV (postoperative nausea and vomiting). Past Surgical History:  has a past surgical history that includes  section; Spine surgery (2015); Upper gastrointestinal endoscopy (); Hysterectomy, total abdominal; Adenoidectomy (); Cholecystectomy, laparoscopic; and pr colonoscopy flx dx w/collj spec when pfrmd (N/A, 2018). Social History:  reports that she has never smoked. She has never used smokeless tobacco. She reports current alcohol use of about 2.0 standard drinks of alcohol per week. She reports that she does not use drugs. Family History: family history includes Cancer (age of onset: 28) in her paternal grandmother; Heart Disease (age of onset: 61) in her paternal grandfather; High Blood Pressure in her father; Other in her maternal grandfather; Other (age of onset: 29) in her mother.      The patients home [place]) 446 ms    P Axis -6 degrees    R Axis -9 degrees    T Axis 18 degrees       Radiology:  XR CHEST (2 VW)   Final Result   No acute cardiopulmonary disease.             ------------------------- NURSING NOTES AND VITALS REVIEWED ---------------------------  Date / Time Roomed:  10/20/2020  5:48 PM  ED Bed Assignment:  22/22    The nursing notes within the ED encounter and vital signs as below have been reviewed. BP (!) 143/78   Pulse 71   Temp 98.5 °F (36.9 °C) (Oral)   Resp 16   Ht 5' 1\" (1.549 m)   Wt 184 lb (83.5 kg)   LMP 01/03/2017   SpO2 100%   BMI 34.77 kg/m²   Oxygen Saturation Interpretation: Normal      ------------------------------------------ PROGRESS NOTES ------------------------------------------  9:20 PM EDT  I have spoken with the patient and discussed todays results, in addition to providing specific details for the plan of care and counseling regarding the diagnosis and prognosis. Their questions are answered at this time and they are agreeable with the plan. I discussed at length with them reasons for immediate return here for re evaluation. They will followup with their primary care physician by calling their office tomorrow. --------------------------------- ADDITIONAL PROVIDER NOTES ---------------------------------  At this time the patient is without objective evidence of an acute process requiring hospitalization or inpatient management. They have remained hemodynamically stable throughout their entire ED visit and are stable for discharge with outpatient follow-up. The plan has been discussed in detail and they are aware of the specific conditions for emergent return, as well as the importance of follow-up. New Prescriptions    No medications on file       Diagnosis:  1. Chest pain, unspecified type        Disposition:  Patient's disposition: Discharge to home  Patient's condition is stable.     Patient was seen and evaluated by both myself and Jasmyne Meyers Oneyda Holt DO  Resident  10/20/20 [id number]

## 2020-10-21 LAB
EKG ATRIAL RATE: 72 BPM
EKG P AXIS: -6 DEGREES
EKG P-R INTERVAL: 134 MS
EKG Q-T INTERVAL: 408 MS
EKG QRS DURATION: 68 MS
EKG QTC CALCULATION (BAZETT): 446 MS
EKG R AXIS: -9 DEGREES
EKG T AXIS: 18 DEGREES
EKG VENTRICULAR RATE: 72 BPM

## 2020-10-21 PROCEDURE — 93010 ELECTROCARDIOGRAM REPORT: CPT | Performed by: INTERNAL MEDICINE

## 2020-11-09 ENCOUNTER — OFFICE VISIT (OUTPATIENT)
Dept: PRIMARY CARE CLINIC | Age: 45
End: 2020-11-09
Payer: COMMERCIAL

## 2020-11-09 VITALS
HEIGHT: 61 IN | WEIGHT: 184 LBS | RESPIRATION RATE: 16 BRPM | TEMPERATURE: 98.4 F | DIASTOLIC BLOOD PRESSURE: 80 MMHG | BODY MASS INDEX: 34.74 KG/M2 | HEART RATE: 96 BPM | SYSTOLIC BLOOD PRESSURE: 126 MMHG | OXYGEN SATURATION: 98 %

## 2020-11-09 DIAGNOSIS — Z20.822 CLOSE EXPOSURE TO COVID-19 VIRUS: ICD-10-CM

## 2020-11-09 PROCEDURE — 99202 OFFICE O/P NEW SF 15 MIN: CPT | Performed by: CLINICAL NURSE SPECIALIST

## 2020-11-09 RX ORDER — METHYLPREDNISOLONE 4 MG/1
TABLET ORAL
Qty: 21 TABLET | Refills: 0 | Status: SHIPPED | OUTPATIENT
Start: 2020-11-09 | End: 2020-11-15

## 2020-11-09 RX ORDER — AZITHROMYCIN 250 MG/1
250 TABLET, FILM COATED ORAL SEE ADMIN INSTRUCTIONS
Qty: 6 TABLET | Refills: 0 | Status: SHIPPED | OUTPATIENT
Start: 2020-11-09 | End: 2020-11-14

## 2020-11-09 NOTE — PROGRESS NOTES
Subjective:      Patient ID: Yusuf Cast is a 54 y.o. female. Patient presented to the [facility] with a chief complaint of sore throat, ear pain, cough and headache. Patient stated that her sore throat began on Friday. Patient stated that she then felt feverish on Saturday. Now having cough, shortness of breath and headache. Patient stated that she feels winded when she goes up the stairs. Patient now has loss of smell and taste. Review of Systems   Constitutional: Positive for fever. HENT: Positive for ear pain and sore throat. Eyes: Negative. Respiratory: Positive for cough and shortness of breath. Dry cough   Cardiovascular: Negative. Gastrointestinal: Positive for diarrhea. Endocrine: Negative. Genitourinary: Negative. Musculoskeletal: Negative. Skin: Negative. Allergic/Immunologic: Negative. Neurological: Positive for headaches. Hematological: Negative. Psychiatric/Behavioral: Negative. Objective:   Physical Exam  Vitals signs and nursing note reviewed. Constitutional:       General: She is not in acute distress. Appearance: Normal appearance. She is normal weight. She is not ill-appearing, toxic-appearing or diaphoretic. HENT:      Head: Normocephalic. Right Ear: Tympanic membrane and external ear normal. There is no impacted cerumen. Left Ear: Tympanic membrane and external ear normal. There is no impacted cerumen. Ears:      Comments: Erythema to bilateral ear canals     Nose: Nose normal. No congestion or rhinorrhea. Mouth/Throat:      Mouth: Mucous membranes are moist.      Pharynx: Oropharynx is clear. Posterior oropharyngeal erythema present. No oropharyngeal exudate. Eyes:      General: No scleral icterus. Right eye: No discharge. Left eye: No discharge. Extraocular Movements: Extraocular movements intact.       Conjunctiva/sclera: Conjunctivae normal.      Pupils: Pupils 16   Ht 5' 1\" (1.549 m)   Wt 184 lb (83.5 kg)   LMP 01/03/2017   SpO2 98%   BMI 34.77 kg/m²     Assessment:       Diagnosis Orders   1. Close exposure to COVID-19 virus     2. Pharyngitis, unspecified etiology  azithromycin (ZITHROMAX) 250 MG tablet    methylPREDNISolone (MEDROL DOSEPACK) 4 MG tablet   3. Bronchitis             Plan:      Reviewed medication, allergies and past medical history. Patient was nasally swabbed for covid. Advised patient to self isolate until the covid testing results are available. Zpack and medrol dose pack were escribed to the pharmacy. Advised patient to follow up with the flu clinic with any concerns. Go to the ER with any worsening of her condition.         Kellee Garcia, APRN - CNP

## 2020-11-10 LAB — SARS-COV-2, PCR: NOT DETECTED

## 2020-11-11 ASSESSMENT — ENCOUNTER SYMPTOMS
ALLERGIC/IMMUNOLOGIC NEGATIVE: 1
EYES NEGATIVE: 1
SHORTNESS OF BREATH: 1
COUGH: 1
DIARRHEA: 1
SORE THROAT: 1

## 2020-11-23 ENCOUNTER — OFFICE VISIT (OUTPATIENT)
Dept: FAMILY MEDICINE CLINIC | Age: 45
End: 2020-11-23
Payer: COMMERCIAL

## 2020-11-23 VITALS
HEART RATE: 111 BPM | DIASTOLIC BLOOD PRESSURE: 86 MMHG | TEMPERATURE: 97.9 F | SYSTOLIC BLOOD PRESSURE: 136 MMHG | BODY MASS INDEX: 35.5 KG/M2 | OXYGEN SATURATION: 97 % | WEIGHT: 188 LBS | HEIGHT: 61 IN

## 2020-11-23 PROCEDURE — 90686 IIV4 VACC NO PRSV 0.5 ML IM: CPT | Performed by: FAMILY MEDICINE

## 2020-11-23 PROCEDURE — 99213 OFFICE O/P EST LOW 20 MIN: CPT | Performed by: FAMILY MEDICINE

## 2020-11-23 PROCEDURE — 90471 IMMUNIZATION ADMIN: CPT | Performed by: FAMILY MEDICINE

## 2020-11-23 RX ORDER — OMEPRAZOLE 20 MG/1
CAPSULE, DELAYED RELEASE ORAL
Qty: 90 CAPSULE | Refills: 1 | Status: SHIPPED
Start: 2020-11-23 | End: 2020-12-28

## 2020-11-23 NOTE — PROGRESS NOTES
[facility]  Office Progress Note - Dr. Lizette Quigley  20    CC:   Chief Complaint   Patient presents with    Blood Pressure Check        HPI:    Elevated BP  Had some CP recently and went to ed  Cardiac workup negative, But BP was high. 157/81 initially and hen 140s throughout the ED visit. Has felt more anx and stressed and will feel some pressure in chest.   Has been watching diet, some inc exercise, int fasting. Has sometimes noted palps at night, has gone on for a long time, she has gotten up and sat in chair for a while.   + snoring. She has woken twice with a gasp, like she couldn't breathe - \"it was really really scary. \" Twice in last 3 months. Jumped out of bed. She did have drainage PND at the time. Stopped omeprazole - and had to restart as probably was helping with reflux at night . She had an at home sleep study a few years ago and she had \"a couple incidents but nothing that needed addressed. \"    Neck Circ 14.5 inches. [place]  14    Blood pressure is controlled today.  But borderline  BP Readings from Last 3 Encounters:   20 136/86   20 126/80   10/20/20 (!) 143/78           _________________________________________________________  Past Medical History:   Diagnosis Date    Arthritis     Asthma     Diarrhea     Diverticulosis     GERD (gastroesophageal reflux disease)     Stomach pain, controlled with omeprazole    Hyperlipidemia     not on any medications    Ocular migraine     PONV (postoperative nausea and vomiting)        Family History   Problem Relation Age of Onset    Other Mother 29        passed of brain aneurysm    High Blood Pressure Father     Other Maternal Grandfather         COPD    Cancer Paternal Grandmother 28        colon    Heart Disease Paternal Grandfather 61       Past Surgical History:   Procedure Laterality Date    ADENOIDECTOMY  0     SECTION     Jame Cardozo HYSTERECTOMY, TOTAL ABDOMINAL      ND COLONOSCOPY FLX DX W/COLLJ SPEC WHEN PFRMD N/A 9/17/2018    COLONOSCOPY DIAGNOSTIC performed by Daniela Sanders MD at [address] [place] [address]  03/2015    L4 or L5 Microdiscectomy    UPPER GASTROINTESTINAL ENDOSCOPY  2012    resulted with alkaline reflux, Dr. Segura Samples History     Tobacco Use    Smoking status: Never Smoker    Smokeless tobacco: Never Used   Substance Use Topics    Alcohol use: Yes     Alcohol/week: 2.0 standard drinks     Types: 2 Glasses of wine per week     Comment: social    Drug use: No     _________________________________________________________  ROS: POSITIVE: apnea? Weight gain, high BP. Otherwise:  No CP, No palpitations,   No sob, No cough,   No abd pain, No heartburn,   No headaches, No vision changes, No hearing changes,   No tingling, No numbness, No weakness,   No bowel changes, No hematochezia, No melena,  No bladder changes, No hematuria  No skin rashes, No skin lesions. No polyuria, polydipsia, polyphagia. Sometimes anxious mood. ROS otherwise negative unless as listed in HPI. Chart reviewed and updated where appropriate for PMH, Fam, and Soc Hx.  __________________________________________________________  Physical Exam   /86 (Site: Right Upper Arm, Position: Sitting, Cuff Size: Large Adult)   Pulse 111   Temp 97.9 °F (36.6 °C) (Temporal)   Ht 5' 1\" (1.549 m)   Wt 188 lb (85.3 kg)   LMP 01/03/2017   SpO2 97%   BMI 35.52 kg/m²   Wt Readings from Last 3 Encounters:   11/23/20 188 lb (85.3 kg)   11/09/20 184 lb (83.5 kg)   10/20/20 184 lb (83.5 kg)       Constitutional:    She is oriented to person, place, and time. She appears well-developed and well-nourished. HENT:     Nose: Nose normal.    Mouth/Throat: Oropharynx is clear and moist. Moderate narrowing. Eyes:    Conjunctivae are normal.    Pupils are equal, round, and reactive to light. EOMI. Neck:    Normal range of motion.     No thyromegaly or nodules noted. No bruit. Cardiovascular:    Normal rate, regular rhythm and normal heart sounds. No murmur. No gallop and no friction rub. Pulmonary/Chest:    Effort normal and breath sounds normal.    No wheezes. No rales or rhonchi. Abdominal:    Soft. Obese. Bowel sounds are normal.    No distension. No tenderness. Musculoskeletal:    Normal range of motion. No joint swelling noted. No peripheral edema. Skin:    Skin is warm and dry. No rashes, No lesions. Psychiatric:    She has a sometimes anxious mood and normal affect. Normal groom and dress. No SI or HI.   ________________________________________________________  Current Outpatient Medications on File Prior to Visit   Medication Sig Dispense Refill    Multiple Vitamins-Minerals (MULTIVITAMIN & MINERAL PO) Take 1 tablet by mouth daily      FLUoxetine (PROZAC) 20 MG capsule take 1 capsule by mouth once daily 30 capsule 5    cetirizine (ZYRTEC) 10 MG tablet Take 1 tablet by mouth daily For allergies 30 tablet 5    fluticasone (FLONASE) 50 MCG/ACT nasal spray 2 sprays by Nasal route daily 1 Bottle 2     No current facility-administered medications on file prior to visit. Patient Active Problem List   Diagnosis Code    Gastritis K29.70    HLD (hyperlipidemia) E78.5    Pre-hypertension R03.0    Toe pain, left M79.675      ________________________________________________________  Assessment / Plan  April was seen today for blood pressure check. Diagnoses and all orders for this visit:    Elevated blood pressure reading  Encouraged to monitor pressure couple times a week, record, bring to next appt. Dec salt, exercise reg, weight loss, dec Etoh consumption if any. Elevated LDL cholesterol level  -     Lipid Panel; Future  Update labs. AMA (obstructive sleep apnea)  -     Home Sleep Study; Future  Weight gain, recent gasping episodes.    Start with in home sleep study (req by insurance 2 years ago)  Neck circ and epworth on referral form. Weight gain  -     TSH without Reflex; Future  Dec calories, increase activity. Other orders  -     omeprazole (PRILOSEC) 20 MG delayed release capsule; take 1 capsule by mouth once daily  -     INFLUENZA, QUADV, 3 YRS AND OLDER, IM PF, PREFILL SYR OR SDV, 0.5ML (AFLURIA QUADV, PF)    Return in about 5 weeks (around 12/28/2020). or as scheduled. Patient counseled to follow up sooner or seek more acute care if symptoms worsening or not improving according to plan. Electronically signed by Alpesh Underwood MD on 11/24/2020    This note may have been created using dictation software.  Efforts were made to reduce grammatical or syntax errors, but some may persist.

## 2020-11-24 DIAGNOSIS — R63.5 WEIGHT GAIN: ICD-10-CM

## 2020-11-24 DIAGNOSIS — E78.00 ELEVATED LDL CHOLESTEROL LEVEL: ICD-10-CM

## 2020-11-24 LAB
CHOLESTEROL, TOTAL: 295 MG/DL (ref 0–199)
HDLC SERPL-MCNC: 53 MG/DL
LDL CHOLESTEROL CALCULATED: 165 MG/DL (ref 0–99)
TRIGL SERPL-MCNC: 384 MG/DL (ref 0–149)
TSH SERPL DL<=0.05 MIU/L-ACNC: 2.03 UIU/ML (ref 0.27–4.2)
VLDLC SERPL CALC-MCNC: 77 MG/DL

## 2020-12-08 ENCOUNTER — TELEPHONE (OUTPATIENT)
Dept: FAMILY MEDICINE CLINIC | Age: 45
End: 2020-12-08

## 2020-12-08 NOTE — TELEPHONE ENCOUNTER
Patient calling in today asking if something can be called in for her cough. She was seen in the flu clinic on 11/9/20 and her covid test did come back negative. She was given ATB and steroid but her cough is not getting any better. Mucinex was helping but is no longer working through the night. Pharmacy in chart verified. Please advise.

## 2020-12-08 NOTE — TELEPHONE ENCOUNTER
Recommend picking up some Delsym OTC and she can continue mucinex. Also recommend CXR given it sounds like this has been going on a month. I want to make sure no signs of pneumonia / no further need for abx. CXR order placed.

## 2020-12-11 NOTE — TELEPHONE ENCOUNTER
Notified patient. Patient verbalized understanding. Pt is out of town but will have done on her return.

## 2020-12-15 RX ORDER — ACETAMINOPHEN 160 MG/5ML
SUSPENSION ORAL
Qty: 30 TABLET | Refills: 5 | Status: SHIPPED
Start: 2020-12-15 | End: 2021-06-07

## 2020-12-15 NOTE — TELEPHONE ENCOUNTER
Last Appointment:  11/23/2020  Future Appointments   Date Time Provider Eugene Causey   12/28/2020  3:00 PM Blaine Padilla  [address] [address]

## 2020-12-28 ENCOUNTER — OFFICE VISIT (OUTPATIENT)
Dept: FAMILY MEDICINE CLINIC | Age: 45
End: 2020-12-28
Payer: COMMERCIAL

## 2020-12-28 VITALS
OXYGEN SATURATION: 98 % | HEART RATE: 85 BPM | HEIGHT: 61 IN | DIASTOLIC BLOOD PRESSURE: 86 MMHG | WEIGHT: 190 LBS | SYSTOLIC BLOOD PRESSURE: 130 MMHG | TEMPERATURE: 97 F | BODY MASS INDEX: 35.87 KG/M2

## 2020-12-28 PROCEDURE — 99213 OFFICE O/P EST LOW 20 MIN: CPT | Performed by: FAMILY MEDICINE

## 2020-12-28 RX ORDER — OMEPRAZOLE 40 MG/1
40 CAPSULE, DELAYED RELEASE ORAL DAILY
Qty: 30 CAPSULE | Refills: 1 | Status: SHIPPED
Start: 2020-12-28 | End: 2021-03-16

## 2020-12-28 NOTE — PROGRESS NOTES
[facility]  Office Progress Note - Dr. Felipa Ayala  12/28/20    CC:   Chief Complaint   Patient presents with    Hypertension        HPI: preHTN readings persisting.   130s. / 80s usually  Denies CP, sob, abd pain, headaches, vision changes. Cough improved as we increased her omeprazole to 40mg. She has not had any further sudden reflux episode. She has a globus sensation at times after swallowing. Sometimes will have some reflux after meals. No voice change or hoarseness.       The 10-year ASCVD risk score (Popeye Bass, et al., 2013) is: 1.8%    Values used to calculate the score:      Age: 54 years      Sex: Female      Is Non- : No      Diabetic: No      Tobacco smoker: No      Systolic Blood Pressure: 143 mmHg      Is BP treated: No      HDL Cholesterol: 53 mg/dL      Total Cholesterol: 295 mg/dL  Lab Results   Component Value Date    CHOL 295 (H) 11/24/2020    CHOL 267 (H) 03/02/2020    CHOL 223 (H) 04/20/2018     Lab Results   Component Value Date    TRIG 384 (H) 11/24/2020    TRIG 156 (H) 03/02/2020    TRIG 150 (H) 04/20/2018     Lab Results   Component Value Date    HDL 53 11/24/2020    HDL 54 03/02/2020    HDL 51 04/20/2018     Lab Results   Component Value Date    LDLCALC 165 (H) 11/24/2020    LDLCALC 182 (H) 03/02/2020    LDLCALC 142 (H) 04/20/2018     _________________________________________________________  Past Medical History:   Diagnosis Date    Arthritis     Asthma     Diarrhea     Diverticulosis     GERD (gastroesophageal reflux disease)     Stomach pain, controlled with omeprazole    Hyperlipidemia     not on any medications    Ocular migraine     PONV (postoperative nausea and vomiting)        Family History   Problem Relation Age of Onset    Other Mother 29        passed of brain aneurysm    High Blood Pressure Father     Other Maternal Grandfather         COPD    Cancer Paternal Grandmother 31        colon  Heart Disease Paternal Grandfather 61       Past Surgical History:   Procedure Laterality Date    ADENOIDECTOMY  0     SECTION     Rebecca Jensen    HYSTERECTOMY, TOTAL ABDOMINAL      VT COLONOSCOPY FLX DX W/COLLJ [address] [facility] [address] WHEN PFRMD N/A 2018    COLONOSCOPY DIAGNOSTIC performed by Walter Garcia MD at [address] SURGERY  2015    L4 or L5 Microdiscectomy    UPPER GASTROINTESTINAL ENDOSCOPY      resulted with alkaline reflux, Dr. Reina Vazquez History     Tobacco Use    Smoking status: Passive Smoke Exposure - Never Smoker    Smokeless tobacco: Never Used   Substance Use Topics    Alcohol use: Yes     Alcohol/week: 2.0 standard drinks     Types: 2 Glasses of wine per week     Comment: social    Drug use: No     _________________________________________________________  ROS: POSITIVE:gerd  Otherwise:  No CP, No palpitations,   No sob, No cough,   No abd pain, No heartburn,   No headaches, No vision changes, No hearing changes,   No tingling, No numbness, No weakness,   No bowel changes, No hematochezia, No melena,  No bladder changes, No hematuria  No skin rashes, No skin lesions. No polyuria, polydipsia, polyphagia. Stable mood. ROS otherwise negative unless as listed in HPI. Chart reviewed and updated where appropriate for PMH, Fam, and Soc Hx.  __________________________________________________________  Physical Exam   /86 (Site: Left Upper Arm, Position: Sitting, Cuff Size: Large Adult)   Pulse 85   Temp 97 °F (36.1 °C) (Temporal)   Ht 5' 1\" (1.549 m)   Wt 190 lb (86.2 kg)   LMP 2017   SpO2 98%   BMI 35.90 kg/m²   Wt Readings from Last 3 Encounters:   20 190 lb (86.2 kg)   20 188 lb (85.3 kg)   20 184 lb (83.5 kg)       Constitutional:    She is oriented to person, place, and time. She appears well-developed and well-nourished.    HENT:    Nose: Nose normal. Mouth/Throat: Oropharynx is clear and moist.   Eyes:    Conjunctivae are normal.    Pupils are equal, round, and reactive to light. EOMI. Neck:    Normal range of motion. No thyromegaly or nodules noted. No bruit. Cardiovascular:    Normal rate, regular rhythm and normal heart sounds. No murmur. No gallop and no friction rub. Pulmonary/Chest:    Effort normal and breath sounds normal.    No wheezes. No rales or rhonchi. Abdominal:    Soft. BMI 35 Bowel sounds are normal.    No distension. No tenderness. Musculoskeletal:    Normal range of motion. No joint swelling noted. No peripheral edema. Skin:    Skin is warm and dry. No rashes, No lesions. Psychiatric:    She has a normal mood and affect. Normal groom and dress. No SI or HI.   ________________________________________________________  Current Outpatient Medications on File Prior to Visit   Medication Sig Dispense Refill    RA ALLERGY RELIEF, CETIRIZINE, 10 MG tablet take 1 tablet by mouth once daily 30 tablet 5    Multiple Vitamins-Minerals (MULTIVITAMIN & MINERAL PO) Take 1 tablet by mouth daily      FLUoxetine (PROZAC) 20 MG capsule take 1 capsule by mouth once daily 30 capsule 5    fluticasone (FLONASE) 50 MCG/ACT nasal spray 2 sprays by Nasal route daily 1 Bottle 2     No current facility-administered medications on file prior to visit. Patient Active Problem List   Diagnosis Code    Gastritis K29.70    HLD (hyperlipidemia) E78.5    Pre-hypertension R03.0    Toe pain, left M79.675      ________________________________________________________  Assessment / Plan  April was seen today for hypertension. Diagnoses and all orders for this visit:    Gastroesophageal reflux disease, unspecified whether esophagitis present  -     omeprazole (PRILOSEC) 40 MG delayed release capsule; Take 1 capsule by mouth Daily  Improved. Continue higher PPI for 2 months then down to 20mg daily again. Obtain Old EGD from [facility]    Prehypertension  Weight loss, exercise regularly 150 minutes total weekly. Hypertriglyceridemia  Weight loss, decrease carbohydrates in diet, and exercise regularly. ASCVD low. Return in about 3 months (around 3/28/2021). or as scheduled. Patient counseled to follow up sooner or seek more acute care if symptoms worsening or not improving according to plan. Electronically signed by Shanice Jerry MD on 12/28/2020    This note may have been created using dictation software.  Efforts were made to reduce grammatical or syntax errors, but some may persist.

## 2021-02-01 ENCOUNTER — PATIENT MESSAGE (OUTPATIENT)
Dept: FAMILY MEDICINE CLINIC | Age: 46
End: 2021-02-01

## 2021-02-01 DIAGNOSIS — F32.1 MAJOR DEPRESSIVE DISORDER, SINGLE EPISODE, MODERATE (HCC): ICD-10-CM

## 2021-02-01 DIAGNOSIS — Z12.31 ENCOUNTER FOR MAMMOGRAM TO ESTABLISH BASELINE MAMMOGRAM: Primary | ICD-10-CM

## 2021-02-02 NOTE — TELEPHONE ENCOUNTER
From: Yola Sánchez  To: Adama Gloria MD  Sent: 2/1/2021 9:06 PM EST  Subject: Non-Urgent Medical Question    Hi Doc~ I am having a really hard time with my weight. I have found that I never feel full, it is very scary and I am afraid my weight is going to continue to go up. Any suggestions when some one never feels full ? ?

## 2021-02-05 RX ORDER — FLUOXETINE 10 MG/1
10 CAPSULE ORAL DAILY
Qty: 5 CAPSULE | Refills: 0 | Status: SHIPPED
Start: 2021-02-05 | End: 2021-03-04

## 2021-02-05 RX ORDER — PAROXETINE 10 MG/1
10 TABLET, FILM COATED ORAL DAILY
Qty: 30 TABLET | Refills: 1 | Status: SHIPPED
Start: 2021-02-05 | End: 2021-03-04

## 2021-03-03 DIAGNOSIS — F32.1 MAJOR DEPRESSIVE DISORDER, SINGLE EPISODE, MODERATE (HCC): ICD-10-CM

## 2021-03-03 RX ORDER — FLUOXETINE 10 MG/1
10 CAPSULE ORAL DAILY
Qty: 5 CAPSULE | Refills: 0 | OUTPATIENT
Start: 2021-03-03 | End: 2021-03-08

## 2021-03-03 NOTE — TELEPHONE ENCOUNTER
Last Appointment:  12/28/2020  Future Appointments   Date Time Provider Eugene Causey   3/30/2021  3:40 PM Bettina Villa  W [facility] [address]

## 2021-03-04 RX ORDER — PAROXETINE 10 MG/1
10 TABLET, FILM COATED ORAL EVERY MORNING
Qty: 45 TABLET | Refills: 1 | Status: SHIPPED
Start: 2021-03-04 | End: 2021-08-04

## 2021-03-04 NOTE — TELEPHONE ENCOUNTER
Spoke with pt and she states she has actually been off the prozac for 3 weeks and would like to try the paxil (she reported crying all the time). She could not remember the name of the paxil that is why she had requested the prozac.

## 2021-03-04 NOTE — TELEPHONE ENCOUNTER
Not sure of request for refill on prozac. Plan was to wean prozac and then switch to paxil. Need more info.

## 2021-03-16 DIAGNOSIS — K21.9 GASTROESOPHAGEAL REFLUX DISEASE, UNSPECIFIED WHETHER ESOPHAGITIS PRESENT: ICD-10-CM

## 2021-03-16 RX ORDER — OMEPRAZOLE 40 MG/1
CAPSULE, DELAYED RELEASE ORAL
Qty: 30 CAPSULE | Refills: 5 | Status: SHIPPED
Start: 2021-03-16 | End: 2021-11-17 | Stop reason: SDUPTHER

## 2021-04-12 ENCOUNTER — OFFICE VISIT (OUTPATIENT)
Dept: CHIROPRACTIC MEDICINE | Age: 46
End: 2021-04-12
Payer: COMMERCIAL

## 2021-04-12 VITALS — OXYGEN SATURATION: 98 % | TEMPERATURE: 97.7 F | HEART RATE: 81 BPM | RESPIRATION RATE: 16 BRPM

## 2021-04-12 DIAGNOSIS — M62.830 MUSCLE SPASM OF BACK: ICD-10-CM

## 2021-04-12 DIAGNOSIS — M54.2 CERVICALGIA: Primary | ICD-10-CM

## 2021-04-12 DIAGNOSIS — M54.04 PANNICULITIS AFFECTING REGIONS OF NECK AND BACK, THORACIC REGION: ICD-10-CM

## 2021-04-12 DIAGNOSIS — M54.50 BILATERAL LOW BACK PAIN WITHOUT SCIATICA, UNSPECIFIED CHRONICITY: ICD-10-CM

## 2021-04-12 PROCEDURE — 97014 ELECTRIC STIMULATION THERAPY: CPT | Performed by: CHIROPRACTOR

## 2021-04-12 PROCEDURE — 98941 CHIROPRACT MANJ 3-4 REGIONS: CPT | Performed by: CHIROPRACTOR

## 2021-04-12 PROCEDURE — 99213 OFFICE O/P EST LOW 20 MIN: CPT | Performed by: CHIROPRACTOR

## 2021-04-12 NOTE — PROGRESS NOTES
21 ZAMZAM Sánchez : 1975 Sex: female  Age: 54 y.o. Chief Complaint   Patient presents with    Neck Pain     pt today presents for c/o of left sided neck pain- went to massage therapy and it is better but still present when she lays down       HPI:   Back Pain  Patient presents for evaluation of left sided neck pain. I last saw Yola Sánchez over 1 year ago. The current symptoms have been present for several days and include Left greater than right neck and upper back pain. Initial inciting event: none. Symptoms are worse in the morning. Aggravating factors identifiable by the patient are turning head side to side. Alleviating factors identifiable by the patient are massage. Treatments initiated by the patient: Massagehelped. Identifies left shoulder blade as area of complaint  1/10 pain today  No UE pain. Has had some feelings of arms asleep at times during the night  Also with some LBP during massage - wants me to check it too. Current Outpatient Medications:     Turmeric (QC TUMERIC COMPLEX PO), Take by mouth, Disp: , Rfl:     omeprazole (PRILOSEC) 40 MG delayed release capsule, take 1 capsule by mouth once daily, Disp: 30 capsule, Rfl: 5    PARoxetine (PAXIL) 10 MG tablet, Take 1 tablet by mouth every morning May increase to 20mg daily after 2 weeks if doing ok., Disp: 45 tablet, Rfl: 1    RA ALLERGY RELIEF, CETIRIZINE, 10 MG tablet, take 1 tablet by mouth once daily, Disp: 30 tablet, Rfl: 5    Multiple Vitamins-Minerals (MULTIVITAMIN & MINERAL PO), Take 1 tablet by mouth daily, Disp: , Rfl:     fluticasone (FLONASE) 50 MCG/ACT nasal spray, 2 sprays by Nasal route daily, Disp: 1 Bottle, Rfl: 2    Exam:   Vitals:    21 0837   Pulse: 81   Resp: 16   Temp: 97.7 °F (36.5 °C)   SpO2: 98%     He appears well. No acute distress. Alert and oriented x3. Ambulates without assistance.   Cervical active ranges of motion are moderately limited in bilateral rotation with endrange pain; mildly limited in bilateral lateral flexion, flexion and extension with endrange pain. Lumbar active ranges of motion reveal full flexion, moderate reduction in bilateral lateral flexion and moderate reduction in extension. She has endrange pain with right lateral flexion. Reflexes are +2 and symmetrical at the Biceps, Brachioradialis, Triceps, Patella and Achilles. Manual muscle testing reveal 5/5 strength throughout the UE and LE indicator muscles B/L. Sensation to light touch is WNL to the upper and lower extremity dermatomes. Henson's and Tromner's are absent. Plantar response reveals down-going toes. Posterior Tibial and Radial pulses 2/4. Seated SLR: Negative bilateral  Vera's: Negative bilateral    Cervical Compression: Negative   Cervical Distraction: Negative  Foraminal Compression: Negative bilateral  Maximum Cervical Rotatory Compression Testing: Negative bilateral    There are hypertonic and tender fibers noted today in the cervical, thoracic and lumbar paraspinal muscles. Active trigger points in the bilateral levator scapulae near their insertion on the scapular border. Joint fixation is noted with motion screening at bilateral SI joints, L5-S1, T4-6, C5-6. April was seen today for neck pain. Diagnoses and all orders for this visit:    Cervicalgia    Muscle spasm of back    Panniculitis affecting regions of neck and back, thoracic region    Bilateral low back pain without sciatica, unspecified chronicity        Treatment Plan:  EMS with heat to the upper thoracic region for 15 minutes to address muscle spasm/hypertension and alleviate pain. Diversified manipulation to the affected segments in the cervical, thoracic, lumbar and SI joints. Tolerated well. I will see her back later this week for continued care.   If symptoms dramatically improved and she is pain-free, she can call to cancel        Seen By:  Meng Welch

## 2021-06-07 DIAGNOSIS — J30.1 CHRONIC SEASONAL ALLERGIC RHINITIS DUE TO POLLEN: ICD-10-CM

## 2021-06-07 RX ORDER — CETIRIZINE HYDROCHLORIDE 10 MG/1
TABLET ORAL
Qty: 90 TABLET | Refills: 1 | Status: SHIPPED
Start: 2021-06-07 | End: 2021-12-20

## 2021-06-07 NOTE — TELEPHONE ENCOUNTER
Last Appointment:  12/28/2020  Future Appointments   Date Time Provider Eugene Causey   6/25/2021  2:40 PM Sathya Byrnes  [address]

## 2021-06-25 ENCOUNTER — OFFICE VISIT (OUTPATIENT)
Dept: FAMILY MEDICINE CLINIC | Age: 46
End: 2021-06-25
Payer: COMMERCIAL

## 2021-06-25 VITALS
WEIGHT: 196 LBS | SYSTOLIC BLOOD PRESSURE: 118 MMHG | OXYGEN SATURATION: 96 % | DIASTOLIC BLOOD PRESSURE: 80 MMHG | BODY MASS INDEX: 37.03 KG/M2 | TEMPERATURE: 97.3 F | HEART RATE: 82 BPM

## 2021-06-25 DIAGNOSIS — R30.0 DYSURIA: Primary | ICD-10-CM

## 2021-06-25 DIAGNOSIS — R19.7 DIARRHEA DUE TO MALABSORPTION: ICD-10-CM

## 2021-06-25 DIAGNOSIS — M25.50 POLYARTHRALGIA: ICD-10-CM

## 2021-06-25 DIAGNOSIS — K21.9 GASTROESOPHAGEAL REFLUX DISEASE, UNSPECIFIED WHETHER ESOPHAGITIS PRESENT: ICD-10-CM

## 2021-06-25 DIAGNOSIS — U07.1 COVID-19: ICD-10-CM

## 2021-06-25 DIAGNOSIS — E78.00 ELEVATED CHOLESTEROL: ICD-10-CM

## 2021-06-25 DIAGNOSIS — K90.9 DIARRHEA DUE TO MALABSORPTION: ICD-10-CM

## 2021-06-25 LAB
ALBUMIN SERPL-MCNC: 4.3 G/DL (ref 3.5–5.2)
ALP BLD-CCNC: 69 U/L (ref 35–104)
ALT SERPL-CCNC: 15 U/L (ref 0–32)
ANION GAP SERPL CALCULATED.3IONS-SCNC: 13 MMOL/L (ref 7–16)
AST SERPL-CCNC: 20 U/L (ref 0–31)
BASOPHILS ABSOLUTE: 0.04 E9/L (ref 0–0.2)
BASOPHILS RELATIVE PERCENT: 0.5 % (ref 0–2)
BILIRUB SERPL-MCNC: 0.3 MG/DL (ref 0–1.2)
BILIRUBIN, POC: NORMAL
BLOOD URINE, POC: NORMAL
BUN BLDV-MCNC: 8 MG/DL (ref 6–20)
C-REACTIVE PROTEIN: 0.3 MG/DL (ref 0–0.4)
CALCIUM SERPL-MCNC: 9.5 MG/DL (ref 8.6–10.2)
CHLORIDE BLD-SCNC: 102 MMOL/L (ref 98–107)
CHOLESTEROL, TOTAL: 232 MG/DL (ref 0–199)
CLARITY, POC: CLEAR
CO2: 24 MMOL/L (ref 22–29)
COLOR, POC: YELLOW
CREAT SERPL-MCNC: 0.7 MG/DL (ref 0.5–1)
EOSINOPHILS ABSOLUTE: 0.17 E9/L (ref 0.05–0.5)
EOSINOPHILS RELATIVE PERCENT: 2.2 % (ref 0–6)
GFR AFRICAN AMERICAN: >60
GFR NON-AFRICAN AMERICAN: >60 ML/MIN/1.73
GLUCOSE BLD-MCNC: 87 MG/DL (ref 74–99)
GLUCOSE URINE, POC: NORMAL
HCT VFR BLD CALC: 41.4 % (ref 34–48)
HDLC SERPL-MCNC: 42 MG/DL
HEMOGLOBIN: 13.8 G/DL (ref 11.5–15.5)
IMMATURE GRANULOCYTES #: 0.02 E9/L
IMMATURE GRANULOCYTES %: 0.3 % (ref 0–5)
KETONES, POC: NORMAL
LDL CHOLESTEROL CALCULATED: 121 MG/DL (ref 0–99)
LEUKOCYTE EST, POC: NORMAL
LYMPHOCYTES ABSOLUTE: 2.11 E9/L (ref 1.5–4)
LYMPHOCYTES RELATIVE PERCENT: 27 % (ref 20–42)
MCH RBC QN AUTO: 28.9 PG (ref 26–35)
MCHC RBC AUTO-ENTMCNC: 33.3 % (ref 32–34.5)
MCV RBC AUTO: 86.8 FL (ref 80–99.9)
MONOCYTES ABSOLUTE: 0.52 E9/L (ref 0.1–0.95)
MONOCYTES RELATIVE PERCENT: 6.6 % (ref 2–12)
NEUTROPHILS ABSOLUTE: 4.96 E9/L (ref 1.8–7.3)
NEUTROPHILS RELATIVE PERCENT: 63.4 % (ref 43–80)
NITRITE, POC: NORMAL
PDW BLD-RTO: 13.1 FL (ref 11.5–15)
PH, POC: 6.5
PLATELET # BLD: 375 E9/L (ref 130–450)
PMV BLD AUTO: 10.3 FL (ref 7–12)
POTASSIUM SERPL-SCNC: 3.7 MMOL/L (ref 3.5–5)
PROTEIN, POC: NORMAL
RBC # BLD: 4.77 E12/L (ref 3.5–5.5)
RHEUMATOID FACTOR: <10 IU/ML (ref 0–13)
SEDIMENTATION RATE, ERYTHROCYTE: 13 MM/HR (ref 0–20)
SODIUM BLD-SCNC: 139 MMOL/L (ref 132–146)
SPECIFIC GRAVITY, POC: 1.02
TOTAL PROTEIN: 7.4 G/DL (ref 6.4–8.3)
TRIGL SERPL-MCNC: 345 MG/DL (ref 0–149)
UROBILINOGEN, POC: 0.2
VLDLC SERPL CALC-MCNC: 69 MG/DL
WBC # BLD: 7.8 E9/L (ref 4.5–11.5)

## 2021-06-25 PROCEDURE — 81002 URINALYSIS NONAUTO W/O SCOPE: CPT | Performed by: FAMILY MEDICINE

## 2021-06-25 PROCEDURE — 99214 OFFICE O/P EST MOD 30 MIN: CPT | Performed by: FAMILY MEDICINE

## 2021-06-25 RX ORDER — ASPIRIN 81 MG/1
81 TABLET ORAL EVERY OTHER DAY
COMMUNITY
End: 2022-05-10 | Stop reason: CLARIF

## 2021-06-25 RX ORDER — MULTIVIT WITH MINERALS/LUTEIN
250 TABLET ORAL DAILY
COMMUNITY
End: 2022-08-22

## 2021-06-25 RX ORDER — ZINC GLUCONATE 50 MG
50 TABLET ORAL DAILY
COMMUNITY
End: 2022-08-22

## 2021-06-25 SDOH — ECONOMIC STABILITY: TRANSPORTATION INSECURITY
IN THE PAST 12 MONTHS, HAS THE LACK OF TRANSPORTATION KEPT YOU FROM MEDICAL APPOINTMENTS OR FROM GETTING MEDICATIONS?: NO

## 2021-06-25 SDOH — ECONOMIC STABILITY: FOOD INSECURITY: WITHIN THE PAST 12 MONTHS, YOU WORRIED THAT YOUR FOOD WOULD RUN OUT BEFORE YOU GOT MONEY TO BUY MORE.: NEVER TRUE

## 2021-06-25 SDOH — ECONOMIC STABILITY: TRANSPORTATION INSECURITY
IN THE PAST 12 MONTHS, HAS LACK OF TRANSPORTATION KEPT YOU FROM MEETINGS, WORK, OR FROM GETTING THINGS NEEDED FOR DAILY LIVING?: NO

## 2021-06-25 SDOH — ECONOMIC STABILITY: FOOD INSECURITY: WITHIN THE PAST 12 MONTHS, THE FOOD YOU BOUGHT JUST DIDN'T LAST AND YOU DIDN'T HAVE MONEY TO GET MORE.: NEVER TRUE

## 2021-06-25 ASSESSMENT — SOCIAL DETERMINANTS OF HEALTH (SDOH): HOW HARD IS IT FOR YOU TO PAY FOR THE VERY BASICS LIKE FOOD, HOUSING, MEDICAL CARE, AND HEATING?: NOT HARD AT ALL

## 2021-06-25 NOTE — PROGRESS NOTES
[facility]  Office Progress Note - Dr. Lore Marcum  6/25/21    CC:   Chief Complaint   Patient presents with    Urinary Frequency    Gastroesophageal Reflux        /80   Pulse 82   Temp 97.3 °F (36.3 °C)   Wt 196 lb (88.9 kg)   LMP 01/03/2017   SpO2 96%   BMI 37.03 kg/m²   Wt Readings from Last 3 Encounters:   06/25/21 196 lb (88.9 kg)   12/28/20 190 lb (86.2 kg)   11/23/20 188 lb (85.3 kg)       HPI: Krysten Chicas about 2 weeks ago.  as well. She is feeling well. At this time. Health department that she could unquarantine on Tuesday this week. She does not have taste and smell right now, otherwise symptoms have resolved. Bouts of diarrhea  Doesn't seem to relate to anything. Sometimes with stomach pains. Taking metamucil seems to help. Lasts all day usually when she does have the bouts   Used to have regular predictable bowel habits. No blood. + melena. She thinks that this was started prior to when her gallbladder was removed. GERD remains well controlled with use of omeprazole 40 mg daily, but if she misses a few doses her symptoms quickly come back and she ends up with heartburn at night. See previous encounters for more details on that. Urinary frequency which started with covid. Has gotten a little better. No dysuria. Patient has noted increasing hands pains and right arm pains, more so in the morning with significant stiffness. When she gets started, this seems to improve throughout the day. She has not noted any nicolle joint swelling or joint redness.     Hyperlipidemia  Follow-up    Lab Results   Component Value Date    CHOL 295 (H) 11/24/2020    CHOL 267 (H) 03/02/2020    CHOL 223 (H) 04/20/2018     Lab Results   Component Value Date    TRIG 384 (H) 11/24/2020    TRIG 156 (H) 03/02/2020    TRIG 150 (H) 04/20/2018     Lab Results   Component Value Date    HDL 53 11/24/2020    HDL 54 03/02/2020    HDL 51 04/20/2018     Lab Results Component Value Date    LDLCALC 165 (H) 11/24/2020    LDLCALC 182 (H) 03/02/2020    LDLCALC 142 (H) 04/20/2018     Lipids are not adequately controlled. _________________________________________________________    Assessment / Plan  April was seen today for urinary frequency and gastroesophageal reflux. Diagnoses and all orders for this visit:    Urinary frequency  -     POCT Urinalysis no Micro  No evidence of infection on urinalysis. Symptoms started at the onset of COVID-19. No dysuria. We will wait to see if symptoms completely resolved. Diarrhea due to malabsorption  -     POCT Fit Test; Future  We will start with a fit test.  If no blood present, likely try cholestyramine to see if this helps improve diarrhea episodes. If that does not work, we will likely try Bentyl. COVID-19  Had infection about 2 weeks or so ago. She was let out of quarantine this week. She has no lasting symptoms except for loss of taste and smell right now. Polyarthralgia  -     CBC Auto Differential; Future  -     Comprehensive Metabolic Panel; Future  -     Sedimentation Rate; Future  -     C-Reactive Protein; Future  -     RHEUMATOID FACTOR; Future  Story somewhat concerning for rheumatoid arthritis. She is of the right age to have onset here. She has not had any inflammatory markers or rheumatoid factor recently. We will get some blood work. Elevated cholesterol  -     Lipid Panel; Future  Update lipids. Consider statin if LDL has increased. GERD  Remains controlled on omeprazole. If she misses a few doses though she starts to get heartburn at night again. Return in about 6 months (around 12/25/2021). or as scheduled. Patient counseled to follow up sooner or seek more acute care if symptoms worsening or not improving according to plan.      Electronically signed by Seamus Calix MD on 6/25/2021    _________________________________________________________  Current Outpatient Medications on File Prior to Visit   Medication Sig Dispense Refill    aspirin 81 MG EC tablet Take 81 mg by mouth daily      Ascorbic Acid (VITAMIN C) 250 MG tablet Take 250 mg by mouth daily      zinc gluconate 50 MG tablet Take 50 mg by mouth daily      cetirizine (ZYRTEC) 10 MG tablet take 1 tablet by mouth once daily 90 tablet 1    Turmeric (QC TUMERIC COMPLEX PO) Take by mouth      omeprazole (PRILOSEC) 40 MG delayed release capsule take 1 capsule by mouth once daily 30 capsule 5    PARoxetine (PAXIL) 10 MG tablet Take 1 tablet by mouth every morning May increase to 20mg daily after 2 weeks if doing ok. 45 tablet 1    Multiple Vitamins-Minerals (MULTIVITAMIN & MINERAL PO) Take 1 tablet by mouth daily      fluticasone (FLONASE) 50 MCG/ACT nasal spray 2 sprays by Nasal route daily 1 Bottle 2     No current facility-administered medications on file prior to visit.        Patient Active Problem List   Diagnosis Code    Gastritis K29.70    HLD (hyperlipidemia) E78.5    Pre-hypertension R03.0    Toe pain, left M79.675     _________________________________________________________  Past Medical History:   Diagnosis Date    Arthritis     Asthma     Diarrhea     Diverticulosis     GERD (gastroesophageal reflux disease)     Stomach pain, controlled with omeprazole    Hyperlipidemia     not on any medications    Ocular migraine     PONV (postoperative nausea and vomiting)        Family History   Problem Relation Age of Onset    Other Mother 29        passed of brain aneurysm    High Blood Pressure Father     Other Maternal Grandfather         COPD    Cancer Paternal Grandmother 28        colon    Heart Disease Paternal Grandfather 61       Past Surgical History:   Procedure Laterality Date    ADENOIDECTOMY  0     SECTION     Andry Machado    HYSTERECTOMY, TOTAL ABDOMINAL      ND COLONOSCOPY FLX DX W/COLLJ SPEC WHEN PFRMD N/A 2018    COLONOSCOPY DIAGNOSTIC performed by Diego Dejesus MD at [address] [facility]  03/2015    L4 or L5 Microdiscectomy    UPPER GASTROINTESTINAL ENDOSCOPY  2012    resulted with alkaline reflux, Dr. Dallas Kemp History     Tobacco Use    Smoking status: Passive Smoke Exposure - Never Smoker    Smokeless tobacco: Never Used   Vaping Use    Vaping Use: Never used   Substance Use Topics    Alcohol use: Yes     Alcohol/week: 2.0 standard drinks     Types: 2 Glasses of wine per week     Comment: social    Drug use: No       Chart reviewed and updated where appropriate for PMH, Fam, and Soc Hx.  _________________________________________________________  ROS: POSITIVE: As in the HPI. Otherwise Pertinent negatives are negative.    __________________________________________________________  Physical Exam   Constitutional:    She is oriented to person, place, and time. She appears well-developed and well-nourished. Eyes:    Conjunctivae are normal.    Pupils are equal, round, and reactive to light. EOMI. Neck:    Normal range of motion. No thyromegaly or nodules noted. No bruit. No LAD. Cardiovascular:    Normal rate, regular rhythm and normal heart sounds. No murmur. No gallop and no friction rub. Pulmonary/Chest:    Effort normal and breath sounds normal.    No wheezes. No rales or rhonchi. Abdominal:    Soft. Bowel sounds are normal.    No distension. No tenderness. Musculoskeletal:    Normal range of motion. No joint swelling noted. No peripheral edema. Skin:    Skin is warm and dry. No rashes, No lesions. Psychiatric:    She has a normal mood and affect. Normal groom and dress. No SI or HI.   ________________________________________________________    This note may have been created using dictation software.  Efforts were made to reduce errors, but some may persist.

## 2021-08-04 NOTE — TELEPHONE ENCOUNTER
Last Appointment:  6/25/2021  Future Appointments   Date Time Provider Eugene Causey   12/27/2021  9:00 AM Naya Nye  [address] [facility] [address]

## 2021-08-05 RX ORDER — PAROXETINE 10 MG/1
10 TABLET, FILM COATED ORAL EVERY MORNING
Qty: 30 TABLET | Refills: 5 | Status: SHIPPED
Start: 2021-08-05 | End: 2022-05-10 | Stop reason: CLARIF

## 2021-08-31 ENCOUNTER — PATIENT MESSAGE (OUTPATIENT)
Dept: FAMILY MEDICINE CLINIC | Age: 46
End: 2021-08-31

## 2021-08-31 DIAGNOSIS — M79.671 RIGHT FOOT PAIN: Primary | ICD-10-CM

## 2021-08-31 NOTE — TELEPHONE ENCOUNTER
From: Yola Sánchez  To: Mary Wade MD  Sent: 8/31/2021 8:48 AM EDT  Subject: Non-Urgent Medical Question    I am in need of a referral to a foot doctor. I know there is one in your building if you could send over a referral for me. It is my right foot, I am having pain in the arch of my foot and the outside of it, in addition to what I think is plantar facisatis flare up. Been going on for about two months.

## 2021-09-01 DIAGNOSIS — R19.7 DIARRHEA DUE TO MALABSORPTION: ICD-10-CM

## 2021-09-01 DIAGNOSIS — K90.9 DIARRHEA DUE TO MALABSORPTION: ICD-10-CM

## 2021-09-01 LAB
CONTROL: NORMAL
HEMOCCULT STL QL: NEGATIVE

## 2021-09-02 ENCOUNTER — OFFICE VISIT (OUTPATIENT)
Dept: PODIATRY | Age: 46
End: 2021-09-02
Payer: COMMERCIAL

## 2021-09-02 VITALS — WEIGHT: 190 LBS | BODY MASS INDEX: 35.87 KG/M2 | HEIGHT: 61 IN

## 2021-09-02 DIAGNOSIS — M79.671 RIGHT FOOT PAIN: Primary | ICD-10-CM

## 2021-09-02 DIAGNOSIS — M72.2 PLANTAR FASCIITIS: ICD-10-CM

## 2021-09-02 PROCEDURE — 99203 OFFICE O/P NEW LOW 30 MIN: CPT | Performed by: PODIATRIST

## 2021-09-02 RX ORDER — MELOXICAM 15 MG/1
15 TABLET ORAL DAILY
Qty: 30 TABLET | Refills: 1 | Status: SHIPPED
Start: 2021-09-02 | End: 2022-01-31

## 2021-09-02 NOTE — PROGRESS NOTES
New patient in office with c/o right foot pain. Sx x 2 months. Denies any injury. Xrays obtained prior to  Princess : 1975 Sex: female  Age: 51 y.o. Patient was referred by Joseph Fernandez MD    CC:    Pain right heel    HPI:   This pleasant 52-year female patient referred me today pain right heel. Does have tenderness worse in the morning at the end of the antibiotic right heel. No recent injury or trauma. Symptoms present for the past 2 months. Did have radiographs today. Denies current use of anti-inflammatories. Denies current custom orthotics. No additional pedal complaints at this time. ROS:  Const: Denies constitutional symptoms  Musculo: Denies symptoms other than stated above  Skin: Denies symptoms other than stated above       Current Outpatient Medications:     meloxicam (MOBIC) 15 MG tablet, Take 1 tablet by mouth daily for 30 doses, Disp: 30 tablet, Rfl: 1    cholestyramine (QUESTRAN) 4 GM/DOSE powder, Take 1 packet by mouth 2 times daily (with meals) Mix with food or drink. , Disp: 60 each, Rfl: 1    PARoxetine (PAXIL) 10 MG tablet, Take 1 tablet by mouth every morning, Disp: 30 tablet, Rfl: 5    aspirin 81 MG EC tablet, Take 81 mg by mouth daily, Disp: , Rfl:     Ascorbic Acid (VITAMIN C) 250 MG tablet, Take 250 mg by mouth daily, Disp: , Rfl:     zinc gluconate 50 MG tablet, Take 50 mg by mouth daily, Disp: , Rfl:     cetirizine (ZYRTEC) 10 MG tablet, take 1 tablet by mouth once daily, Disp: 90 tablet, Rfl: 1    Turmeric (QC TUMERIC COMPLEX PO), Take by mouth, Disp: , Rfl:     omeprazole (PRILOSEC) 40 MG delayed release capsule, take 1 capsule by mouth once daily, Disp: 30 capsule, Rfl: 5    Multiple Vitamins-Minerals (MULTIVITAMIN & MINERAL PO), Take 1 tablet by mouth daily, Disp: , Rfl:     fluticasone (FLONASE) 50 MCG/ACT nasal spray, 2 sprays by Nasal route daily, Disp: 1 Bottle, Rfl: 2  Allergies   Allergen Reactions    Seasonal     Sucralfate     Levaquin [Levofloxacin In D5w]     Reglan [Metoclopramide] Anxiety       Past Medical History:   Diagnosis Date    Arthritis     Asthma     Diarrhea     Diverticulosis     GERD (gastroesophageal reflux disease)     Stomach pain, controlled with omeprazole    Hyperlipidemia     not on any medications    Ocular migraine     PONV (postoperative nausea and vomiting)            Vitals:    09/02/21 1542   Weight: 190 lb (86.2 kg)   Height: 5' 1\" (1.549 m)       Work History/Social History: Foot and ankle history:     Focused Lower Extremity Physical Exam:    Neurovascular examination:    Dorsalis Pedis palpable bilateral.  Posterior tibialis palpable bilateral.    Capillary Refill Time:  Immediate return  Hair growth:  Symmetrical and bilateral   Skin:  Not atrophic  Edema: No edema bilateral feet or ankles. Neurologic:  Light touch intact bilateral.      Musculoskeletal/ Orthopedic examination:    Equinis: Absent bilateral  Dorsiflexion, plantarflexion, inversion, eversion bilateral 5 out of 5 muscle strength  Wiggling toes  Negative Homans  Tenderness to palpation medial band and central band right plantar fascial.   No pain insertion Achilles tendon. Ankle can squeeze test.    Dermatology examination:    No open skin lesions or abrasions bilateral lower extremity. Assessment and Plan:  April was seen today for foot pain. Diagnoses and all orders for this visit:    Right foot pain  -     XR FOOT RIGHT (MIN 3 VIEWS); Future    Plantar fasciitis    Other orders  -     meloxicam (MOBIC) 15 MG tablet; Take 1 tablet by mouth daily for 30 doses      April presents today right plantar fasciitis    Radiographs reviewed right foot 9/2/2021. Heel spur noted. No acute fracture dislocation. Plantar fasciitis is a condition involving inflammation and pain to the heel.   I did recommend passive and active range of motion stretches and strengthening of both the Achilles tendon and plantar fascia  Ice 10 minutes each night on the bottom of the plantar fascia  Avoid barefoot  Continue supportive walking shoe with well supported insert  I did recommend Powerstep Original full-length over-the-counter orthotics. Mobic 15 mg take once daily as directed. Risk and benefits of anti-inflammatories discussed in detail. I will follow-up 1 month to monitor progression  Return in about 1 month (around 10/2/2021). Seen By:  Tone Peraza DPM      Document was created using voice recognition software. Note was reviewed, however may contain grammatical errors.

## 2021-09-02 NOTE — LETTER
Suyapa Taylor MD   [address] [facility]     Patient: Yola Sánchez  YOB: 1975  Date of Visit: 9/9/2021     Dear Suyapa Taylor MD    Thank you for referring Yola Sánchez to me for evaluation. April was seen for right plantar fasciitis. I did review radiographs. Does have mild heel spur noted. I did recommend Mobic 15 mg once daily until follow-up 1 month. Once again, thank you very much for this kind referral and allowing me to participate in the care of this patient. If you have questions, please do not hesitate to call me.     Sincerely,        Fadia Maurer, [facility]  [address], [facility] [id number]  Phone: [phone]  Fax: [phone]

## 2021-09-07 RX ORDER — CHOLESTYRAMINE 4 G/9G
4 POWDER, FOR SUSPENSION ORAL 2 TIMES DAILY WITH MEALS
Qty: 60 EACH | Refills: 1 | Status: SHIPPED
Start: 2021-09-07 | End: 2021-11-04

## 2021-09-13 ENCOUNTER — PATIENT MESSAGE (OUTPATIENT)
Dept: FAMILY MEDICINE CLINIC | Age: 46
End: 2021-09-13

## 2021-09-13 DIAGNOSIS — R05.3 CHRONIC COUGH: Primary | ICD-10-CM

## 2021-09-13 NOTE — TELEPHONE ENCOUNTER
From: Yola Sánchez  To: Flor Khan MD  Sent: 9/13/2021 10:56 AM EDT  Subject: Non-Urgent Medical Question    Hello ,  I am having terrible coughing spells and have been taking all my medications including Flonase. It is worst at night and I continuously have to swallow. Any suggestions for me. My  is very concerned about it as well and urged me to contact you.

## 2021-11-04 RX ORDER — CHOLESTYRAMINE 4 G/5.5G
POWDER, FOR SUSPENSION ORAL
Qty: 60 PACKET | Refills: 2 | Status: SHIPPED
Start: 2021-11-04 | End: 2021-11-30 | Stop reason: ALTCHOICE

## 2021-11-17 DIAGNOSIS — K21.9 GASTROESOPHAGEAL REFLUX DISEASE, UNSPECIFIED WHETHER ESOPHAGITIS PRESENT: ICD-10-CM

## 2021-11-17 RX ORDER — OMEPRAZOLE 40 MG/1
CAPSULE, DELAYED RELEASE ORAL
Qty: 30 CAPSULE | Refills: 5 | Status: SHIPPED
Start: 2021-11-17 | End: 2022-05-09

## 2021-11-17 NOTE — TELEPHONE ENCOUNTER
Last Appointment:  6/25/2021  Future Appointments   Date Time Provider Eugene Causey   12/30/2021  1:00 PM Nery Nugent  W [address] [place] [address]

## 2021-11-29 ENCOUNTER — TELEPHONE (OUTPATIENT)
Dept: FAMILY MEDICINE CLINIC | Age: 46
End: 2021-11-29

## 2021-11-29 NOTE — TELEPHONE ENCOUNTER
Spoke with pt and she state that she has been having lower rib cage pain going around to her back. She notices the pain when she takes a deep breath. She states this has been going on for 1-2 weeks. I offered express care (there was not any immediate openings on your schedule), she declined. Please advise.

## 2021-11-30 ENCOUNTER — OFFICE VISIT (OUTPATIENT)
Dept: FAMILY MEDICINE CLINIC | Age: 46
End: 2021-11-30
Payer: COMMERCIAL

## 2021-11-30 VITALS
OXYGEN SATURATION: 98 % | WEIGHT: 179 LBS | BODY MASS INDEX: 33.82 KG/M2 | SYSTOLIC BLOOD PRESSURE: 136 MMHG | HEART RATE: 91 BPM | DIASTOLIC BLOOD PRESSURE: 78 MMHG | TEMPERATURE: 97.8 F | RESPIRATION RATE: 16 BRPM

## 2021-11-30 DIAGNOSIS — R07.89 CHEST WALL PAIN: Primary | ICD-10-CM

## 2021-11-30 PROCEDURE — 99213 OFFICE O/P EST LOW 20 MIN: CPT | Performed by: FAMILY MEDICINE

## 2021-11-30 NOTE — PATIENT INSTRUCTIONS
Patient Education        Costochondritis: Care Instructions  Your Care Instructions  You have chest pain because the cartilage of your rib cage is inflamed. This problem is called costochondritis. This type of chest wall pain may last from days to weeks. It is not a heart problem. Sometimes costochondritis occurs with a cold or the flu, and other times the exact cause is not known. Follow-up care is a key part of your treatment and safety. Be sure to make and go to all appointments, and call your doctor if you are having problems. It's also a good idea to know your test results and keep a list of the medicines you take. How can you care for yourself at home? · Take medicines for pain and inflammation exactly as directed. ? If the doctor gave you a prescription medicine, take it as prescribed. ? If you are not taking a prescription pain medicine, ask your doctor if you can take an over-the-counter medicine. ? Do not take two or more pain medicines at the same time unless the doctor told you to. Many pain medicines have acetaminophen, which is Tylenol. Too much acetaminophen (Tylenol) can be harmful. · It may help to use a warm compress or heating pad (set on low) on your chest. You can also try alternating heat and ice. Put ice or a cold pack on the area for 10 to 20 minutes at a time. Put a thin cloth between the ice and your skin. · Avoid any activity that strains the chest area. As your pain gets better, you can slowly return to your normal activities. · Do not use tape, an elastic bandage, a \"rib belt,\" or anything else that restricts your chest wall motion. When should you call for help? Call 911 anytime you think you may need emergency care. For example, call if:    · You have new or different chest pain or pressure. This may occur with:  ? Sweating. ? Shortness of breath. ? Nausea or vomiting. ? Pain that spreads from the chest to the neck, jaw, or one or both shoulders or arms.   ? Dizziness or lightheadedness. ? A fast or uneven pulse. After calling 911, chew 1 adult-strength aspirin. Wait for an ambulance. Do not try to drive yourself.     · You have severe trouble breathing. Call your doctor now or seek immediate medical care if:    · You have a fever or cough.     · You have any trouble breathing.     · Your chest pain gets worse. Watch closely for changes in your health, and be sure to contact your doctor if:    · Your chest pain continues even though you are taking anti-inflammatory medicine.     · Your chest wall pain has not improved after 5 to 7 days. Where can you learn more? Go to [email or web address] and sign in to your mytrax account. Enter S169 in the [employer] box to learn more about \"Costochondritis: Care Instructions. \"     If you do not have an account, please click on the \"Sign Up Now\" link. Current as of: July 1, 2021               Content Version: 13.0  © 2006-2021 Healthwise, Incorporated. Care instructions adapted under license by [facility] ([facility]). If you have questions about a medical condition or this instruction, always ask your healthcare professional. [place] [address] any warranty or liability for your use of this information.

## 2021-11-30 NOTE — PROGRESS NOTES
[facility]  Office Progress Note - Dr. Marian Watson  11/30/21    CC:   Chief Complaint   Patient presents with    Chest Pain     posteriorly    Tingling     to finger tips         /78   Pulse 91   Temp 97.8 °F (36.6 °C) (Temporal)   Resp 16   Wt 179 lb (81.2 kg)   LMP 01/03/2017   SpO2 98%   BMI 33.82 kg/m²   Wt Readings from Last 3 Encounters:   11/30/21 179 lb (81.2 kg)   09/02/21 190 lb (86.2 kg)   06/25/21 196 lb (88.9 kg)       HPI: couples weeks of lower rib pain wrapping around from her back. Seems to come and go in intensity but always present a little bit  Maybe some sob  Maybe some chest discomfort. Uncertain if any leg swelling. Did have about a 12 hours car ride a week or so before symptoms started   No specific injury. Has also noted some tingling in her fingertips occasionally. This very much sounds like carpal tunnel syndrome. It is worse at night. When she wears a wrist brace, symptoms are better, but still present. On exam she has normal heart and lung sounds. No ectopy. No wheezes rhonchi or rales. Palpation of the posterior chest wall wrapping around the right flank at the lower ribs causes exact reproduction of pain. Less so on the left side. She has no rash or skin discoloration in this area. She has no ankle swelling bilaterally. Circumference is consistent bilaterally. _________________________________________________________    Assessment / Plan  April was seen today for chest pain and tingling. Diagnoses and all orders for this visit:    Chest wall pain    I believe this is very likely chest wall pain. I can reproduce it on her physical exam.  Cardiac or pulmonary etiology would not be reproducible like this on exam.  Probably costochondritis. Advised her to take 400 mg ibuprofen 3 times daily for the next 5 days. She may also try massage and chiropractic care if she wishes.   If she has any progression or development of new symptoms or she fails to improve, we will follow-up and try further testing, likely with an x-ray of her thoracic spine    As needed or as scheduled. Patient counseled to follow up sooner or seek more acute care if symptoms worsening or not improving according to plan. Electronically signed by Gabriela Young MD on 12/1/2021    _________________________________________________________  Current Outpatient Medications on File Prior to Visit   Medication Sig Dispense Refill    Misc Natural Products (ADVANCED JOINT RELIEF) CAPS Take 2 capsules by mouth daily      omeprazole (PRILOSEC) 40 MG delayed release capsule take 1 capsule by mouth once daily 30 capsule 5    meloxicam (MOBIC) 15 MG tablet Take 1 tablet by mouth daily for 30 doses 30 tablet 1    PARoxetine (PAXIL) 10 MG tablet Take 1 tablet by mouth every morning 30 tablet 5    aspirin 81 MG EC tablet Take 81 mg by mouth daily      Ascorbic Acid (VITAMIN C) 250 MG tablet Take 250 mg by mouth daily      zinc gluconate 50 MG tablet Take 50 mg by mouth daily      cetirizine (ZYRTEC) 10 MG tablet take 1 tablet by mouth once daily 90 tablet 1    Turmeric (QC TUMERIC COMPLEX PO) Take by mouth      Multiple Vitamins-Minerals (MULTIVITAMIN & MINERAL PO) Take 1 tablet by mouth daily      fluticasone (FLONASE) 50 MCG/ACT nasal spray 2 sprays by Nasal route daily 1 Bottle 2     No current facility-administered medications on file prior to visit.        Patient Active Problem List   Diagnosis Code    Gastritis K29.70    HLD (hyperlipidemia) E78.5    Pre-hypertension R03.0    Toe pain, left M79.675     _________________________________________________________  Past Medical History:   Diagnosis Date    Arthritis     Asthma     Diarrhea     Diverticulosis     GERD (gastroesophageal reflux disease)     Stomach pain, controlled with omeprazole    Hyperlipidemia     not on any medications    Ocular migraine     PONV (postoperative nausea and vomiting) Family History   Problem Relation Age of Onset    Other Mother 29        passed of brain aneurysm    High Blood Pressure Father     Other Maternal Grandfather         COPD    Cancer Paternal Grandmother 28        colon    Heart Disease Paternal Grandfather 61       Past Surgical History:   Procedure Laterality Date    ADENOIDECTOMY  0     SECTION     Blayne Garay, TOTAL ABDOMINAL      [place] COLONOSCOPY FLX DX W/COLLJ [address] WHEN PFRMD N/A 2018    COLONOSCOPY DIAGNOSTIC performed by Brittany Andres MD at [address] [facility]  2015    L4 or L5 Microdiscectomy    UPPER GASTROINTESTINAL ENDOSCOPY      resulted with alkaline reflux, Dr. [employer] History     Tobacco Use    Smoking status: Passive Smoke Exposure - Never Smoker    Smokeless tobacco: Never Used   Vaping Use    Vaping Use: Never used   Substance Use Topics    Alcohol use: Yes     Alcohol/week: 2.0 standard drinks     Types: 2 Glasses of wine per week     Comment: social    Drug use: No       Chart reviewed and updated where appropriate for PMH, Fam, and Soc Hx.  _________________________________________________________  ROS: POSITIVE: As in the HPI. Otherwise Pertinent negatives are negative.    __________________________________________________________  Physical Exam   Exam as in the HPI.  ________________________________________________________    This note may have been created using dictation software.  Efforts were made to reduce errors, but some may persist.

## 2021-12-07 ENCOUNTER — PATIENT MESSAGE (OUTPATIENT)
Dept: FAMILY MEDICINE CLINIC | Age: 46
End: 2021-12-07

## 2021-12-07 NOTE — TELEPHONE ENCOUNTER
From: Yola Sánchez  To: Dr. Alexandr Ray  Sent: 12/7/2021 9:27 AM EST  Subject: Right Ear    So my right ear is still giving me a lot of pressure in pain I have tried the technique you showed me by holding my nose my left ear pops but my right ear doesnt budge. At night when I lay down it is the worst in my right and I continually clemons. Do you think I would benefit from an antibiotic perhaps theres an infection in there?

## 2021-12-09 RX ORDER — AMOXICILLIN AND CLAVULANATE POTASSIUM 875; 125 MG/1; MG/1
1 TABLET, FILM COATED ORAL 2 TIMES DAILY WITH MEALS
Qty: 20 TABLET | Refills: 0 | Status: SHIPPED | OUTPATIENT
Start: 2021-12-09 | End: 2021-12-19

## 2021-12-18 DIAGNOSIS — J30.1 CHRONIC SEASONAL ALLERGIC RHINITIS DUE TO POLLEN: ICD-10-CM

## 2021-12-20 RX ORDER — CETIRIZINE HYDROCHLORIDE 10 MG/1
TABLET ORAL
Qty: 90 TABLET | Refills: 1 | Status: SHIPPED
Start: 2021-12-20 | End: 2022-06-22

## 2021-12-20 NOTE — TELEPHONE ENCOUNTER
Last Appointment:  11/30/2021  Future Appointments   Date Time Provider Eugene Causey   12/30/2021  1:00 PM Kimi Hargrove  [address] [place] [address]

## 2022-01-03 ENCOUNTER — OFFICE VISIT (OUTPATIENT)
Dept: FAMILY MEDICINE CLINIC | Age: 47
End: 2022-01-03
Payer: COMMERCIAL

## 2022-01-03 VITALS — WEIGHT: 196 LBS | OXYGEN SATURATION: 98 % | TEMPERATURE: 97.1 F | BODY MASS INDEX: 37.03 KG/M2 | HEART RATE: 93 BPM

## 2022-01-03 DIAGNOSIS — Z20.822 SUSPECTED COVID-19 VIRUS INFECTION: Primary | ICD-10-CM

## 2022-01-03 LAB
Lab: ABNORMAL
PERFORMING INSTRUMENT: ABNORMAL
QC PASS/FAIL: ABNORMAL
SARS-COV-2, POC: DETECTED

## 2022-01-03 PROCEDURE — 87426 SARSCOV CORONAVIRUS AG IA: CPT | Performed by: FAMILY MEDICINE

## 2022-01-03 PROCEDURE — 96372 THER/PROPH/DIAG INJ SC/IM: CPT | Performed by: FAMILY MEDICINE

## 2022-01-03 PROCEDURE — 99213 OFFICE O/P EST LOW 20 MIN: CPT | Performed by: FAMILY MEDICINE

## 2022-01-03 RX ORDER — AZITHROMYCIN 250 MG/1
250 TABLET, FILM COATED ORAL SEE ADMIN INSTRUCTIONS
Qty: 6 TABLET | Refills: 0 | Status: SHIPPED | OUTPATIENT
Start: 2022-01-03 | End: 2022-01-08

## 2022-01-03 RX ORDER — GUAIFENESIN 600 MG/1
600 TABLET, EXTENDED RELEASE ORAL 2 TIMES DAILY
Qty: 30 TABLET | Refills: 0 | Status: SHIPPED | OUTPATIENT
Start: 2022-01-03 | End: 2022-01-18

## 2022-01-03 RX ORDER — METHYLPREDNISOLONE ACETATE 40 MG/ML
40 INJECTION, SUSPENSION INTRA-ARTICULAR; INTRALESIONAL; INTRAMUSCULAR; SOFT TISSUE ONCE
Status: COMPLETED | OUTPATIENT
Start: 2022-01-03 | End: 2022-01-03

## 2022-01-03 RX ADMIN — METHYLPREDNISOLONE ACETATE 40 MG: 40 INJECTION, SUSPENSION INTRA-ARTICULAR; INTRALESIONAL; INTRAMUSCULAR; SOFT TISSUE at 13:43

## 2022-01-03 NOTE — PROGRESS NOTES
OFFICE NOTE    1/3/22  Name: April ZAMZAM Parkinson  :1975   Sex:female   Age:46 y.o.       SUBJECTIVE  Chief Complaint   Patient presents with    Cough     onset thursday    Otalgia    Headache       HPI Had previous covid infection    Review of Systems symptoms mild, no fevr, loss of smell or taste, [place]        Current Outpatient Medications:     azithromycin (ZITHROMAX) 250 MG tablet, Take 1 tablet by mouth See Admin Instructions for 5 days 500mg on day 1 followed by 250mg on days 2 - 5, Disp: 6 tablet, Rfl: 0    guaiFENesin (MUCINEX) 600 MG extended release tablet, Take 1 tablet by mouth 2 times daily for 15 days, Disp: 30 tablet, Rfl: 0    cetirizine (ZYRTEC) 10 MG tablet, take 1 tablet by mouth once daily, Disp: 90 tablet, Rfl: 1    Misc Natural Products (ADVANCED JOINT RELIEF) CAPS, Take 2 capsules by mouth daily, Disp: , Rfl:     omeprazole (PRILOSEC) 40 MG delayed release capsule, take 1 capsule by mouth once daily, Disp: 30 capsule, Rfl: 5    meloxicam (MOBIC) 15 MG tablet, Take 1 tablet by mouth daily for 30 doses, Disp: 30 tablet, Rfl: 1    PARoxetine (PAXIL) 10 MG tablet, Take 1 tablet by mouth every morning, Disp: 30 tablet, Rfl: 5    aspirin 81 MG EC tablet, Take 81 mg by mouth daily, Disp: , Rfl:     Ascorbic Acid (VITAMIN C) 250 MG tablet, Take 250 mg by mouth daily, Disp: , Rfl:     zinc gluconate 50 MG tablet, Take 50 mg by mouth daily, Disp: , Rfl:     Turmeric (QC TUMERIC COMPLEX PO), Take by mouth, Disp: , Rfl:     Multiple Vitamins-Minerals (MULTIVITAMIN & MINERAL PO), Take 1 tablet by mouth daily, Disp: , Rfl:     fluticasone (FLONASE) 50 MCG/ACT nasal spray, 2 sprays by Nasal route daily, Disp: 1 Bottle, Rfl: 2  Allergies   Allergen Reactions    Seasonal     Sucralfate     Levaquin [Levofloxacin In D5w]     Reglan [Metoclopramide] Anxiety       Past Medical History:   Diagnosis Date    Arthritis     Asthma     Diarrhea     Diverticulosis     GERD (gastroesophageal reflux disease)     Stomach pain, controlled with omeprazole    Hyperlipidemia     not on any medications    Ocular migraine     PONV (postoperative nausea and vomiting)      Past Surgical History:   Procedure Laterality Date   Leatha Sarkar, TOTAL ABDOMINAL      [place] COLONOSCOPY FLX DX W/COLLJ SPEC WHEN PFRMD N/A 9/17/2018    COLONOSCOPY DIAGNOSTIC performed by Jay Rogel MD at [address] [place] [address]  03/2015    L4 or L5 Microdiscectomy    UPPER GASTROINTESTINAL ENDOSCOPY  2012    resulted with alkaline reflux, Dr. Ashley Hodgson     Family History   Problem Relation Age of Onset    Other Mother 29        passed of brain aneurysm    High Blood Pressure Father     Other Maternal Grandfather         COPD    Cancer Paternal Grandmother 28        colon    Heart Disease Paternal Grandfather 61     Social History     Tobacco History     Smoking Status  Passive Smoke Exposure - Never Smoker Smoking Tobacco Type  Cigarettes    Smokeless Tobacco Use  Never Used          Alcohol History     Alcohol Use Status  Yes Drinks/Week  2 Glasses of wine per week Amount  2.0 standard drinks of alcohol/wk Comment  social          Drug Use     Drug Use Status  No          Sexual Activity     Sexually Active  Not Asked                OBJECTIVE  Vitals:    01/03/22 1229   Pulse: 93   Temp: 97.1 °F (36.2 °C)   SpO2: 98%   Weight: 196 lb (88.9 kg)        Body mass index is 37.03 kg/m². Orders Placed This Encounter   Procedures    POCT COVID-19, Antigen     Order Specific Question:   Is this test for diagnosis or screening? Answer:   Diagnosis of ill patient     Order Specific Question:   Symptomatic for COVID-19 as defined by CDC? Answer:   Yes     Order Specific Question:   Date of Symptom Onset     Answer:   12/30/2021     Order Specific Question:   Hospitalized for COVID-19?      Answer:   No     Order Specific Question:   Admitted to ICU for COVID-19? Answer:   No     Order Specific Question:   Previously tested for COVID-19? Answer:   Yes        EXAM   Physical Exam  Vitals and nursing note reviewed. Constitutional:       Appearance: Normal appearance. She is obese. HENT:      Right Ear: Tympanic membrane normal.      Left Ear: Tympanic membrane normal.      Nose: Congestion and rhinorrhea present. Mouth/Throat:      Pharynx: Posterior oropharyngeal erythema present. Eyes:      General: No scleral icterus. Conjunctiva/sclera: Conjunctivae normal.   Cardiovascular:      Rate and Rhythm: Regular rhythm. Tachycardia present. Musculoskeletal:      Cervical back: Normal range of motion. No tenderness. Lymphadenopathy:      Cervical: No cervical adenopathy. Neurological:      Mental Status: She is alert. April was seen today for cough, otalgia and headache. Diagnoses and all orders for this visit:    Suspected COVID-19 virus infection  -     POCT COVID-19, Antigen  -     azithromycin (ZITHROMAX) 250 MG tablet; Take 1 tablet by mouth See Admin Instructions for 5 days 500mg on day 1 followed by 250mg on days 2 - 5  -     methylPREDNISolone acetate (DEPO-MEDROL) injection 40 mg  -     guaiFENesin (MUCINEX) 600 MG extended release tablet; Take 1 tablet by mouth 2 times daily for 15 days    Tests positive for Covid which is a bit unusual given her history of having had virus within last 6 mos. Does not appear clinically very ill. Zinc, vitamin D, baby ASA qd      Return if symptoms worsen or fail to improve.     Electronically signed by Jazmyne Muro MD on 1/3/22 at 1:19 PM EST

## 2022-01-31 ENCOUNTER — PATIENT MESSAGE (OUTPATIENT)
Dept: FAMILY MEDICINE CLINIC | Age: 47
End: 2022-01-31

## 2022-01-31 ENCOUNTER — OFFICE VISIT (OUTPATIENT)
Dept: FAMILY MEDICINE CLINIC | Age: 47
End: 2022-01-31
Payer: COMMERCIAL

## 2022-01-31 VITALS
HEIGHT: 61 IN | BODY MASS INDEX: 37.57 KG/M2 | SYSTOLIC BLOOD PRESSURE: 138 MMHG | TEMPERATURE: 98.2 F | DIASTOLIC BLOOD PRESSURE: 96 MMHG | HEART RATE: 80 BPM | OXYGEN SATURATION: 99 % | WEIGHT: 199 LBS

## 2022-01-31 DIAGNOSIS — J01.90 ACUTE SINUSITIS, RECURRENCE NOT SPECIFIED, UNSPECIFIED LOCATION: Primary | ICD-10-CM

## 2022-01-31 DIAGNOSIS — U07.1 COVID-19: Primary | ICD-10-CM

## 2022-01-31 DIAGNOSIS — R05.9 COUGH: ICD-10-CM

## 2022-01-31 DIAGNOSIS — U07.1 COVID-19: ICD-10-CM

## 2022-01-31 PROCEDURE — 99213 OFFICE O/P EST LOW 20 MIN: CPT | Performed by: FAMILY MEDICINE

## 2022-01-31 RX ORDER — BENZONATATE 100 MG/1
100-200 CAPSULE ORAL 3 TIMES DAILY PRN
Qty: 60 CAPSULE | Refills: 0 | Status: SHIPPED | OUTPATIENT
Start: 2022-01-31 | End: 2022-02-07

## 2022-01-31 RX ORDER — BROMPHENIRAMINE MALEATE, PSEUDOEPHEDRINE HYDROCHLORIDE, AND DEXTROMETHORPHAN HYDROBROMIDE 2; 30; 10 MG/5ML; MG/5ML; MG/5ML
5 SYRUP ORAL 4 TIMES DAILY PRN
Qty: 240 ML | Refills: 1 | Status: SHIPPED | OUTPATIENT
Start: 2022-01-31 | End: 2022-03-02

## 2022-01-31 RX ORDER — AMOXICILLIN AND CLAVULANATE POTASSIUM 875; 125 MG/1; MG/1
1 TABLET, FILM COATED ORAL 2 TIMES DAILY WITH MEALS
Qty: 20 TABLET | Refills: 0 | Status: SHIPPED | OUTPATIENT
Start: 2022-01-31 | End: 2022-02-10

## 2022-01-31 ASSESSMENT — PATIENT HEALTH QUESTIONNAIRE - PHQ9
SUM OF ALL RESPONSES TO PHQ QUESTIONS 1-9: 0
1. LITTLE INTEREST OR PLEASURE IN DOING THINGS: 0
8. MOVING OR SPEAKING SO SLOWLY THAT OTHER PEOPLE COULD HAVE NOTICED. OR THE OPPOSITE, BEING SO FIGETY OR RESTLESS THAT YOU HAVE BEEN MOVING AROUND A LOT MORE THAN USUAL: 0
10. IF YOU CHECKED OFF ANY PROBLEMS, HOW DIFFICULT HAVE THESE PROBLEMS MADE IT FOR YOU TO DO YOUR WORK, TAKE CARE OF THINGS AT HOME, OR GET ALONG WITH OTHER PEOPLE: 0
2. FEELING DOWN, DEPRESSED OR HOPELESS: 0
7. TROUBLE CONCENTRATING ON THINGS, SUCH AS READING THE NEWSPAPER OR WATCHING TELEVISION: 0
5. POOR APPETITE OR OVEREATING: 0
6. FEELING BAD ABOUT YOURSELF - OR THAT YOU ARE A FAILURE OR HAVE LET YOURSELF OR YOUR FAMILY DOWN: 0
4. FEELING TIRED OR HAVING LITTLE ENERGY: 0
SUM OF ALL RESPONSES TO PHQ QUESTIONS 1-9: 0
SUM OF ALL RESPONSES TO PHQ9 QUESTIONS 1 & 2: 0
SUM OF ALL RESPONSES TO PHQ QUESTIONS 1-9: 0
3. TROUBLE FALLING OR STAYING ASLEEP: 0
9. THOUGHTS THAT YOU WOULD BE BETTER OFF DEAD, OR OF HURTING YOURSELF: 0
SUM OF ALL RESPONSES TO PHQ QUESTIONS 1-9: 0

## 2022-01-31 NOTE — PROGRESS NOTES
[facility]  Office Progress Note - Dr. Kiana Lawler  1/31/22    CC:   Chief Complaint   Patient presents with    Post-COVID Symptoms     Was + for COVID on 01/03/22 - Pt still has cough, SOB, headache, dizziness, fatigue        BP (!) 138/96 (Site: Left Upper Arm, Position: Sitting, Cuff Size: Large Adult)   Pulse 80   Temp 98.2 °F (36.8 °C) (Temporal)   Ht 5' 1\" (1.549 m)   Wt 199 lb (90.3 kg)   LMP 01/03/2017   SpO2 99%   BMI 37.60 kg/m²   Wt Readings from Last 3 Encounters:   01/31/22 199 lb (90.3 kg)   01/03/22 196 lb (88.9 kg)   11/30/21 179 lb (81.2 kg)       HPI: she was dx with covid in early jan. Had it back in June. Stated sore throat, headache, achy/fatigued, OMEvsAOM dx through urgent care - on abx for that, down for that whole week. Has had persisting headaches and coughs through the month  Has bee using mucinex ER. Helps a little bit not great. Was brining up sputum for a while but not more dry. CXR normal today  Acid reflux is fair, continues omeprazole 40mg. In the past she was having severe symptoms and even waking at night choking. She is still havin a lot of sinus symptoms recently. Eyes very pressurized. +PND. + headache.     _________________________________________________________    Assessment / Plan  April was seen today for post-covid symptoms. Diagnoses and all orders for this visit:    Acute sinusitis, recurrence not specified, unspecified location  -     amoxicillin-clavulanate (AUGMENTIN) 875-125 MG per tablet; Take 1 tablet by mouth 2 times daily (with meals) for 10 days  -     benzonatate (TESSALON) 100 MG capsule; Take 1-2 capsules by mouth 3 times daily as needed for Cough  -     brompheniramine-pseudoephedrine-DM (BROMFED DM) 2-30-10 MG/5ML syrup; Take 5 mLs by mouth 4 times daily as needed for Congestion or Cough    COVID-19    Has persisting Sx ever since covid dx in early jan, Sx starting in late Dec. Never really got better. But with most persistent symptoms of sinus infection. OME on right ear, sinus pressure, nasal drainage. Chronic cough. CXR clear. Will Tx for possible bacterial sinusitis just in case, and will tx cough and decongestants. Call for fevers, SOB, etc.     Return if symptoms worsen or fail to improve. or as scheduled. Patient counseled to follow up sooner or seek more acute care if symptoms worsening or not improving according to plan. Electronically signed by Stephen Juarez MD on 1/31/2022    _________________________________________________________  Current Outpatient Medications on File Prior to Visit   Medication Sig Dispense Refill    cetirizine (ZYRTEC) 10 MG tablet take 1 tablet by mouth once daily 90 tablet 1    Misc Natural Products (ADVANCED JOINT RELIEF) CAPS Take 2 capsules by mouth daily      omeprazole (PRILOSEC) 40 MG delayed release capsule take 1 capsule by mouth once daily 30 capsule 5    PARoxetine (PAXIL) 10 MG tablet Take 1 tablet by mouth every morning (Patient taking differently: Take 10 mg by mouth every morning Pt states she is weaning off of Paxil. She is currently taking every 3rd day and hopes to be off of it later this week.) 30 tablet 5    aspirin 81 MG EC tablet Take 81 mg by mouth every other day       Ascorbic Acid (VITAMIN C) 250 MG tablet Take 250 mg by mouth daily      zinc gluconate 50 MG tablet Take 50 mg by mouth daily      Turmeric (QC TUMERIC COMPLEX PO) Take by mouth      Multiple Vitamins-Minerals (MULTIVITAMIN & MINERAL PO) Take 1 tablet by mouth daily      fluticasone (FLONASE) 50 MCG/ACT nasal spray 2 sprays by Nasal route daily 1 Bottle 2     No current facility-administered medications on file prior to visit.        Patient Active Problem List   Diagnosis Code    Gastritis K29.70    HLD (hyperlipidemia) E78.5    Pre-hypertension R03.0    Toe pain, left M79.675     _________________________________________________________  Past Medical History: Diagnosis Date    Arthritis     Asthma     Diarrhea     Diverticulosis     GERD (gastroesophageal reflux disease)     Stomach pain, controlled with omeprazole    Hyperlipidemia     not on any medications    Ocular migraine     PONV (postoperative nausea and vomiting)        Family History   Problem Relation Age of Onset    Other Mother 29        passed of brain aneurysm    High Blood Pressure Father     Other Maternal Grandfather         COPD    Cancer Paternal Grandmother 28        colon    Heart Disease Paternal Grandfather 61       Past Surgical History:   Procedure Laterality Date    ADENOIDECTOMY  0     SECTION     Broadus Bienenstock Dr. Juvenal Meigs    HYSTERECTOMY, TOTAL ABDOMINAL      [place] COLONOSCOPY FLX DX W/COLLJ SPEC WHEN PFRMD N/A 2018    COLONOSCOPY DIAGNOSTIC performed by Kristopher Nash MD at [address]  2015    L4 or L5 Microdiscectomy    UPPER GASTROINTESTINAL ENDOSCOPY      resulted with alkaline reflux, Dr. Titi Baca History     Tobacco Use    Smoking status: Passive Smoke Exposure - Never Smoker    Smokeless tobacco: Never Used   Vaping Use    Vaping Use: Never used   Substance Use Topics    Alcohol use: Yes     Alcohol/week: 2.0 standard drinks     Types: 2 Glasses of wine per week     Comment: social    Drug use: No       Chart reviewed and updated where appropriate for PMH, Fam, and Soc Hx.  _________________________________________________________  ROS: POSITIVE: As in the HPI. Otherwise Pertinent negatives are negative.    __________________________________________________________  Physical Exam   Constitutional:    She is oriented to person, place, and time. She appears well-developed and well-nourished. HENT:    Right Ear: normal Pinna, normal canal, normal TM. OME. Left Ear: normal Pinna, normal canal, normal TM.     Nose: Nose normal.    Mouth/Throat: Oropharynx is clear and moist. Eyes:    Conjunctivae are normal.    Pupils are equal, round, and reactive to light. EOMI. Neck:    Normal range of motion. No thyromegaly or nodules noted. No bruit. No LAD. Cardiovascular:    Normal rate, regular rhythm and normal heart sounds. No murmur. No gallop and no friction rub. Pulmonary/Chest:    Effort normal and breath sounds normal.    No wheezes. No rales or rhonchi. Abdominal:    Soft. Bowel sounds are normal.    No distension. No tenderness. ________________________________________________________    This note may have been created using dictation software.  Efforts were made to reduce errors, but some may persist.

## 2022-02-04 RX ORDER — PAROXETINE 10 MG/1
10 TABLET, FILM COATED ORAL EVERY MORNING
Qty: 30 TABLET | Refills: 5 | OUTPATIENT
Start: 2022-02-04

## 2022-02-04 NOTE — TELEPHONE ENCOUNTER
This patient need this refill or not? My last note says that she was weaning off of it. This looks like an auto fill.

## 2022-02-15 ENCOUNTER — PATIENT MESSAGE (OUTPATIENT)
Dept: FAMILY MEDICINE CLINIC | Age: 47
End: 2022-02-15

## 2022-02-15 RX ORDER — LOSARTAN POTASSIUM 25 MG/1
25 TABLET ORAL DAILY
Qty: 30 TABLET | Refills: 5 | Status: SHIPPED
Start: 2022-02-15 | End: 2022-08-08 | Stop reason: SDUPTHER

## 2022-02-15 NOTE — TELEPHONE ENCOUNTER
From: April ZAMZAM Sánchez  To: Dr. Kyle White  Sent: 2/15/2022 2:38 PM EST  Subject: Blood Pressure     Hi,  My blood pressure remains high. Today it is 151/99 with heart rate of 97 (after a disagreement with me teen). I tracked in AM & PM from 2/01-2/07 it ran at the highest 131/106  to the lowest of 112/73 HR 81 (this one while I was laying down on my left side in the couch). It has me pretty worried and looking for your opinion.

## 2022-02-21 ENCOUNTER — PATIENT MESSAGE (OUTPATIENT)
Dept: FAMILY MEDICINE CLINIC | Age: 47
End: 2022-02-21

## 2022-02-21 NOTE — TELEPHONE ENCOUNTER
From: Yola Sánchez  To: Dr. Lynn Roles  Sent: 2/21/2022 9:01 AM EST  Subject: UTI or bladder infection     Hello~ I am pretty sure I have a UTI as I have burning, urgency, and pain when I urinate. I usually end up with one after Ive been on antibiotics should I leave a sample today?

## 2022-02-21 NOTE — TELEPHONE ENCOUNTER
Would you consider placing UA order or POCT UA?   Or prefer pt be added to schedule today?  (3 provider fills available.)

## 2022-02-22 RX ORDER — SULFAMETHOXAZOLE AND TRIMETHOPRIM 800; 160 MG/1; MG/1
1 TABLET ORAL 2 TIMES DAILY
Qty: 10 TABLET | Refills: 0 | Status: SHIPPED | OUTPATIENT
Start: 2022-02-22 | End: 2022-02-27

## 2022-05-05 ENCOUNTER — TELEPHONE (OUTPATIENT)
Dept: FAMILY MEDICINE CLINIC | Age: 47
End: 2022-05-05

## 2022-05-05 RX ORDER — AMOXICILLIN 500 MG/1
500 CAPSULE ORAL 3 TIMES DAILY
Qty: 21 CAPSULE | Refills: 0 | Status: SHIPPED | OUTPATIENT
Start: 2022-05-05 | End: 2022-05-12

## 2022-05-05 NOTE — TELEPHONE ENCOUNTER
Patient having dental pain and swelling. Cannot get in to dentist until Monday. Tylenol is controlling the pain currently, but patient concerned with swelling. I advised her of [employer] as she works until 4:30 which she was agreeable to, but in case she couldn't make it in time I also told her I would get a message to you. She would like response via [employer].

## 2022-05-06 ENCOUNTER — OFFICE VISIT (OUTPATIENT)
Dept: FAMILY MEDICINE CLINIC | Age: 47
End: 2022-05-06
Payer: COMMERCIAL

## 2022-05-06 VITALS
HEIGHT: 61 IN | BODY MASS INDEX: 36.32 KG/M2 | SYSTOLIC BLOOD PRESSURE: 128 MMHG | DIASTOLIC BLOOD PRESSURE: 84 MMHG | HEART RATE: 85 BPM | RESPIRATION RATE: 18 BRPM | WEIGHT: 192.4 LBS | TEMPERATURE: 98.6 F | OXYGEN SATURATION: 98 %

## 2022-05-06 DIAGNOSIS — K04.7 DENTAL INFECTION: ICD-10-CM

## 2022-05-06 DIAGNOSIS — R22.0 MANDIBULAR SWELLING: Primary | ICD-10-CM

## 2022-05-06 PROCEDURE — 99213 OFFICE O/P EST LOW 20 MIN: CPT | Performed by: PHYSICIAN ASSISTANT

## 2022-05-06 RX ORDER — NAPROXEN 500 MG/1
500 TABLET ORAL 2 TIMES DAILY WITH MEALS
Qty: 14 TABLET | Refills: 0 | Status: SHIPPED
Start: 2022-05-06 | End: 2022-08-22

## 2022-05-06 NOTE — PROGRESS NOTES
Chief Complaint   Otalgia (patient states that she is on amoxicillin . .. this AM she woke up her left side of face had swelling she had this yesterday. . she is having an annoying pain bottom of chin directly where tooth is. . not sure if this is causing these symptoms or something else )      History of Present Illness   Source of history provided by:  patient. Mariza Perkins is a 51 y.o. old female presenting to the walk in clinic for evaluation of swelling over the left mandible and associated discomfort extending into the left jaw and left ear which has been present for the past few days. Patient states that she contacted her PCP a few days ago who is currently treating her for a suspected dental infection with a course of amoxicillin. She has taken 3 doses up to this point. She does have an appointment scheduled to follow-up with her dentist on Monday. She has taken Tylenol at home with minimal symptomatic relief. Pt denies any facial redness, sore throat, fever, chills, HA, or recent illness. Pt is able to handle his/her own secretions and drink fluids without difficulty. Onset:       Spontaneous:   yes. Following Trauma:   no.     Previous Caries:   yes. Recent Dental Procedure:   no.     ROS    Unless otherwise stated in this report or unable to obtain because of the patient's clinical or mental status as evidenced by the medical record, this patients's positive and negative responses for Review of Systems, constitutional, psych, eyes, ENT, cardiovascular, respiratory, gastrointestinal, neurological, genitourinary, musculoskeletal, integument systems and systems related to the presenting problem are either stated in the preceding or were not pertinent or were negative for the symptoms and/or complaints related to the medical problem.     Past Medical History:  has a past medical history of Arthritis, Asthma, Diarrhea, Diverticulosis, GERD (gastroesophageal reflux disease), Hyperlipidemia, Ocular migraine, and PONV (postoperative nausea and vomiting). Past Surgical History:  has a past surgical history that includes  section; Spine surgery (2015); Upper gastrointestinal endoscopy (); Hysterectomy, total abdominal; Adenoidectomy (); Cholecystectomy, laparoscopic; and pr colonoscopy flx dx w/collj spec when pfrmd (N/A, 2018). Social History:  reports that she is a non-smoker but has been exposed to tobacco smoke. She has never used smokeless tobacco. She reports current alcohol use of about 2.0 standard drinks of alcohol per week. She reports that she does not use drugs. Family History: family history includes Cancer (age of onset: 28) in her paternal grandmother; Heart Disease (age of onset: 61) in her paternal grandfather; High Blood Pressure in her father; Other in her maternal grandfather; Other (age of onset: 29) in her mother. Allergies: Seasonal, Sucralfate, Levaquin [levofloxacin in d5w], and Reglan [metoclopramide]    Physical Exam         VS:  /84   Pulse 85   Temp 98.6 °F (37 °C)   Resp 18   Ht 5' 1\" (1.549 m)   Wt 192 lb 6.4 oz (87.3 kg)   LMP 2017   SpO2 98%   BMI 36.35 kg/m²     Constitutional:  Alert, development consistent with age. HEENT:  NC/NT. Airway patent. Eyes PERRL. Ears with normal bilateral TMs and normal bilateral canals. Neck:  Supple. Normal ROM. No adenopathy. Lips:  No erythema or abrasions noted. Mouth:  Normal tongue and moist buccal mucosa. Floor of the mouth is soft. No tenderness in the submental or submandibular space. No tongue elevation. Dental:  Mild tenderness to tapping over the left 1st premolar without decay noted. No trismus present. Mild edema noted over the left mandible and left submandibular gland. No drooling. Good dentition noted.   Respiratory:  Clear to auscultation and breath sounds equal.    CV: Regular rate and rhythm, normal heart sounds, without pathological murmurs, ectopy, gallops, or rubs. Skin:  No rashes, erythema or lesions present, unless noted elsewhere. .  Lymphatics: No lymphangitis or adenopathy noted. Neurological:  Alert and oriented. Motor functions intact. Lab / Imaging Results   (All laboratory and radiology results have been personally reviewed by myself)  Labs:      Imaging: All Radiology results interpreted by Radiologist unless otherwise noted. Assessment / Plan     Impression(s):  April was seen today for otalgia. Diagnoses and all orders for this visit:    Mandibular swelling  -     naproxen (NAPROSYN) 500 MG tablet; Take 1 tablet by mouth 2 times daily (with meals) for 7 days    Dental infection      Disposition:  Disposition: Discharge to home. It is difficult to ascertain if pt's symptoms are related to her ongoing infection or a result of a blocked salivary gland. For acute symptomatic relief, script written for Naprosyn, side effects discussed. Continue amoxicillin as prescribed. Patient also advised gentle massage of the submandibular gland, warm compresses, and sucking on sour lemon drops for additional relief. Keep follow-up appointment with dentist in 3 days for recheck. ED sooner if symptoms worsen or change. ED immediately with severe/worsening pain, severe worsening swelling, overlying erythema, dyspnea, dysphagia, trismus, drooling, or fever. Pt states understanding and is in agreement with this care plan. All questions answered. Anat Peterson PA-C    **This report was transcribed using voice recognition software. Every effort was made to ensure accuracy; however, inadvertent computerized transcription errors may be present.

## 2022-05-08 DIAGNOSIS — K21.9 GASTROESOPHAGEAL REFLUX DISEASE, UNSPECIFIED WHETHER ESOPHAGITIS PRESENT: ICD-10-CM

## 2022-05-09 RX ORDER — OMEPRAZOLE 40 MG/1
CAPSULE, DELAYED RELEASE ORAL
Qty: 90 CAPSULE | Refills: 1 | Status: SHIPPED | OUTPATIENT
Start: 2022-05-09

## 2022-05-09 NOTE — TELEPHONE ENCOUNTER
Last Appointment:  1/31/2022  Future Appointments   Date Time Provider Eugene Causey   5/24/2022  2:40 PM Yolanda Murphy  [address] [facility] [address]

## 2022-05-10 ENCOUNTER — OFFICE VISIT (OUTPATIENT)
Dept: FAMILY MEDICINE CLINIC | Age: 47
End: 2022-05-10
Payer: COMMERCIAL

## 2022-05-10 VITALS
RESPIRATION RATE: 16 BRPM | BODY MASS INDEX: 36.47 KG/M2 | HEART RATE: 83 BPM | WEIGHT: 193.2 LBS | TEMPERATURE: 97.8 F | SYSTOLIC BLOOD PRESSURE: 120 MMHG | OXYGEN SATURATION: 96 % | DIASTOLIC BLOOD PRESSURE: 80 MMHG | HEIGHT: 61 IN

## 2022-05-10 DIAGNOSIS — M54.2 NECK PAIN ON LEFT SIDE: ICD-10-CM

## 2022-05-10 DIAGNOSIS — R22.0 FACIAL SWELLING: Primary | ICD-10-CM

## 2022-05-10 PROCEDURE — 99213 OFFICE O/P EST LOW 20 MIN: CPT | Performed by: FAMILY MEDICINE

## 2022-05-10 NOTE — PROGRESS NOTES
[facility]  Office Progress Note - Dr. Gail Virk  5/10/22    CC:   Chief Complaint   Patient presents with    Pharyngitis     had throat pain last week. she was put on antibiotic. the left side was all swollen up into her face. the swelling has gone down but still has pain. Friday she had a low grade fever. almost feels like she has a lump in her neck. /80   Pulse 83   Temp 97.8 °F (36.6 °C) (Temporal)   Resp 16   Ht 5' 1\" (1.549 m)   Wt 193 lb 3.2 oz (87.6 kg)   LMP 01/03/2017   SpO2 96%   BMI 36.50 kg/m²   Wt Readings from Last 3 Encounters:   05/10/22 193 lb 3.2 oz (87.6 kg)   05/06/22 192 lb 6.4 oz (87.3 kg)   01/31/22 199 lb (90.3 kg)       HPI: wrote last week with fairly sudden throat pain / neck pain and facial swelling, left side only for all. Thought maybe tooth infection, so I sent amoxil 500mg TID for 7 days. Worsened next day and she came to urgent care. No change to plan. She was able to see her dentist yesterday who felt there was no signs of a dental problem. She feels things improved yesterday and today but not quite back to baseline. Still with some soreness in the left neck. throat and still with some mild facial swelling. No fevers, chills, cough, shortness of breath, wheezing, dysphagia. On exam she does have some mild objective left facial swelling. She has what looks like an aphthous ulcer on her buccal mucosa on the left side. No other abnormalities visualized in the mouth. Parotid is nontender and not swollen. No cervical or submandibular lymphadenopathy that is obvious. No mass palpable.  _________________________________________________________    Assessment / Plan  April was seen today for pharyngitis. Diagnoses and all orders for this visit:    Facial swelling    Neck pain on left side    Recommend completing amoxicillin. Monitor over the next week.   If not complete resolution then I would get CT of the neck with contrast, and possibly chest x-ray. Suspect she may have had a swollen lymph node/infection or less likely blocked salivary gland. Follow-up in about 1 week by phone or [employer] message with status or as scheduled. Patient counseled to follow up sooner or seek more acute care if symptoms worsening or not improving according to plan. Electronically signed by Sanna Olmedo MD on 5/10/2022    _________________________________________________________  Current Outpatient Medications on File Prior to Visit   Medication Sig Dispense Refill    omeprazole (PRILOSEC) 40 MG delayed release capsule take 1 capsule by mouth once daily 90 capsule 1    naproxen (NAPROSYN) 500 MG tablet Take 1 tablet by mouth 2 times daily (with meals) for 7 days 14 tablet 0    amoxicillin (AMOXIL) 500 MG capsule Take 1 capsule by mouth 3 times daily for 7 days 21 capsule 0    losartan (COZAAR) 25 MG tablet Take 1 tablet by mouth daily 30 tablet 5    cetirizine (ZYRTEC) 10 MG tablet take 1 tablet by mouth once daily 90 tablet 1    Misc Natural Products (ADVANCED JOINT RELIEF) CAPS Take 2 capsules by mouth daily      Ascorbic Acid (VITAMIN C) 250 MG tablet Take 250 mg by mouth daily      zinc gluconate 50 MG tablet Take 50 mg by mouth daily      Multiple Vitamins-Minerals (MULTIVITAMIN & MINERAL PO) Take 1 tablet by mouth daily      fluticasone (FLONASE) 50 MCG/ACT nasal spray 2 sprays by Nasal route daily 1 Bottle 2     No current facility-administered medications on file prior to visit.        Patient Active Problem List   Diagnosis Code    Gastritis K29.70    HLD (hyperlipidemia) E78.5    Pre-hypertension R03.0    Toe pain, left M79.675     _________________________________________________________  Past Medical History:   Diagnosis Date    Arthritis     Asthma     Diarrhea     Diverticulosis     GERD (gastroesophageal reflux disease)     Stomach pain, controlled with omeprazole    Hyperlipidemia     not on any medications  Ocular migraine     PONV (postoperative nausea and vomiting)        Family History   Problem Relation Age of Onset    Other Mother 29        passed of brain aneurysm    High Blood Pressure Father     Other Maternal Grandfather         COPD    Cancer Paternal Grandmother 28        colon    Heart Disease Paternal Grandfather 61       Past Surgical History:   Procedure Laterality Date    ADENOIDECTOMY  0     SECTION     Caden [place]    HYSTERECTOMY, TOTAL ABDOMINAL      [place] COLONOSCOPY FLX DX W/COLLJ [address] WHEN PFRMD N/A 2018    COLONOSCOPY DIAGNOSTIC performed by Carito Roldan MD at [address] [place] [address]  2015    L4 or L5 Microdiscectomy    UPPER GASTROINTESTINAL ENDOSCOPY      resulted with alkaline reflux, Dr. Ada Velez History     Tobacco Use    Smoking status: Passive Smoke Exposure - Never Smoker    Smokeless tobacco: Never Used   Vaping Use    Vaping Use: Never used   Substance Use Topics    Alcohol use: Yes     Alcohol/week: 2.0 standard drinks     Types: 2 Glasses of wine per week     Comment: social    Drug use: No       Chart reviewed and updated where appropriate for PMH, Fam, and Soc Hx.  _________________________________________________________  ROS: POSITIVE: As in the HPI. Otherwise Pertinent negatives are negative.    __________________________________________________________  Physical Exam   Neck:    Normal range of motion. No thyromegaly or nodules noted. No bruit. No LAD. Cardiovascular:    Normal rate, regular rhythm and normal heart sounds. No murmur. No gallop and no friction rub. Pulmonary/Chest:    Effort normal and breath sounds normal.    No wheezes. No rales or rhonchi. And as in HPI.  ________________________________________________________    This note may have been created using dictation software.  Efforts were made to reduce errors, but some may persist.

## 2022-06-16 ENCOUNTER — OFFICE VISIT (OUTPATIENT)
Dept: PODIATRY | Age: 47
End: 2022-06-16
Payer: COMMERCIAL

## 2022-06-16 VITALS — HEIGHT: 61 IN | WEIGHT: 195 LBS | BODY MASS INDEX: 36.82 KG/M2

## 2022-06-16 DIAGNOSIS — M20.41 HAMMER TOE OF RIGHT FOOT: ICD-10-CM

## 2022-06-16 DIAGNOSIS — M25.571 RIGHT ANKLE PAIN, UNSPECIFIED CHRONICITY: ICD-10-CM

## 2022-06-16 DIAGNOSIS — M72.2 PLANTAR FASCIITIS: ICD-10-CM

## 2022-06-16 DIAGNOSIS — M21.621 TAILOR'S BUNION OF RIGHT FOOT: ICD-10-CM

## 2022-06-16 DIAGNOSIS — M79.671 RIGHT FOOT PAIN: Primary | ICD-10-CM

## 2022-06-16 PROCEDURE — 99213 OFFICE O/P EST LOW 20 MIN: CPT | Performed by: PODIATRIST

## 2022-06-16 RX ORDER — MELOXICAM 15 MG/1
15 TABLET ORAL DAILY
Qty: 30 TABLET | Refills: 1 | Status: SHIPPED
Start: 2022-06-16 | End: 2022-08-22

## 2022-06-16 NOTE — PROGRESS NOTES
Patient in office with c/o right ankle and foot pain. Sx x several months. Wears a night splint which she believes helps with the pain im the morning. Xrays obtained prior to apt. Antonio Reyes MD last seen 05/10/2022. CC:    Right foot pain    HPI:   This familiar 48-year-old female patient my practice is seen today right foot pain. Has been seen in the past for right plantar fasciitis. Denies any heel pain today. States majority pain is on the outside of her right foot but does get occasional ankle pain. No significant ankle pain today. Does also states she has a history of hand pain several years. No current anti-inflammatories. States she has taken Mobic in the past which did seem to help.     ROS:  Const: Denies constitutional symptoms  Musculo: Denies symptoms other than stated above  Skin: Denies symptoms other than stated above       Current Outpatient Medications:     meloxicam (MOBIC) 15 MG tablet, Take 1 tablet by mouth daily for 30 doses, Disp: 30 tablet, Rfl: 1    omeprazole (PRILOSEC) 40 MG delayed release capsule, take 1 capsule by mouth once daily, Disp: 90 capsule, Rfl: 1    naproxen (NAPROSYN) 500 MG tablet, Take 1 tablet by mouth 2 times daily (with meals) for 7 days, Disp: 14 tablet, Rfl: 0    losartan (COZAAR) 25 MG tablet, Take 1 tablet by mouth daily, Disp: 30 tablet, Rfl: 5    cetirizine (ZYRTEC) 10 MG tablet, take 1 tablet by mouth once daily, Disp: 90 tablet, Rfl: 1    Misc Natural Products (ADVANCED JOINT RELIEF) CAPS, Take 2 capsules by mouth daily, Disp: , Rfl:     Ascorbic Acid (VITAMIN C) 250 MG tablet, Take 250 mg by mouth daily, Disp: , Rfl:     zinc gluconate 50 MG tablet, Take 50 mg by mouth daily, Disp: , Rfl:     Multiple Vitamins-Minerals (MULTIVITAMIN & MINERAL PO), Take 1 tablet by mouth daily, Disp: , Rfl:     fluticasone (FLONASE) 50 MCG/ACT nasal spray, 2 sprays by Nasal route daily, Disp: 1 Bottle, Rfl: 2  Allergies   Allergen Reactions    September 2021 for right plantar fasciitis which was treated with oral Mobic and did recommend power steps at that time. No clinical signs of plantar fasciitis today. Radiographs were discussed. No acute fracture dislocations. Mobic 15 mg take once daily as directed. Risk and benefits of anti-inflammatories discussed in detail. We did review radiographs right foot and right ankle today. Clinically presents as a tailor's bunion pain on the right. We did discuss following up with her primary care team for possible underlying arthritis as she did also state hand pain for several years. Will need to follow-up with her primary care team for issues with her hand. I will follow-up 6 weeks. Return in about 6 weeks (around 7/28/2022). Seen By:  Antwan Castillo DPM      Document was created using voice recognition software. Note was reviewed, however may contain grammatical errors.

## 2022-06-22 DIAGNOSIS — J30.1 CHRONIC SEASONAL ALLERGIC RHINITIS DUE TO POLLEN: ICD-10-CM

## 2022-06-22 RX ORDER — CETIRIZINE HYDROCHLORIDE 10 MG/1
TABLET ORAL
Qty: 90 TABLET | Refills: 1 | Status: SHIPPED | OUTPATIENT
Start: 2022-06-22

## 2022-06-22 NOTE — TELEPHONE ENCOUNTER
Last Appointment:  5/10/2022  Future Appointments   Date Time Provider Eugene Causey   7/28/2022  8:15 AM Connor Harris DPM [facility] Pod[facility]

## 2022-07-14 ENCOUNTER — PATIENT MESSAGE (OUTPATIENT)
Dept: FAMILY MEDICINE CLINIC | Age: 47
End: 2022-07-14

## 2022-07-14 DIAGNOSIS — J45.909 REACTIVE AIRWAY DISEASE WITHOUT COMPLICATION, UNSPECIFIED ASTHMA SEVERITY, UNSPECIFIED WHETHER PERSISTENT: ICD-10-CM

## 2022-07-14 RX ORDER — ALBUTEROL SULFATE 90 UG/1
2 AEROSOL, METERED RESPIRATORY (INHALATION) EVERY 6 HOURS PRN
Qty: 1 EACH | Refills: 0 | Status: SHIPPED | OUTPATIENT
Start: 2022-07-14 | End: 2023-08-22

## 2022-07-14 NOTE — TELEPHONE ENCOUNTER
Med history shows an albuterol inhaler rx'd in 2016. Having trouble pulling it from med list.. Reorder or have patient schedule?

## 2022-08-08 RX ORDER — LOSARTAN POTASSIUM 25 MG/1
TABLET ORAL
Qty: 30 TABLET | Refills: 5 | Status: SHIPPED | OUTPATIENT
Start: 2022-08-08

## 2022-08-22 ENCOUNTER — OFFICE VISIT (OUTPATIENT)
Dept: FAMILY MEDICINE CLINIC | Age: 47
End: 2022-08-22
Payer: COMMERCIAL

## 2022-08-22 VITALS
DIASTOLIC BLOOD PRESSURE: 92 MMHG | TEMPERATURE: 97.9 F | HEART RATE: 81 BPM | WEIGHT: 195 LBS | BODY MASS INDEX: 36.82 KG/M2 | SYSTOLIC BLOOD PRESSURE: 142 MMHG | OXYGEN SATURATION: 99 % | HEIGHT: 61 IN

## 2022-08-22 DIAGNOSIS — G89.29 CHRONIC PAIN OF RIGHT KNEE: ICD-10-CM

## 2022-08-22 DIAGNOSIS — G47.33 OSA (OBSTRUCTIVE SLEEP APNEA): ICD-10-CM

## 2022-08-22 DIAGNOSIS — I10 ESSENTIAL HYPERTENSION: ICD-10-CM

## 2022-08-22 DIAGNOSIS — R07.89 CHEST WALL PAIN: ICD-10-CM

## 2022-08-22 DIAGNOSIS — R07.9 CHEST PAIN, UNSPECIFIED TYPE: Primary | ICD-10-CM

## 2022-08-22 DIAGNOSIS — M25.561 CHRONIC PAIN OF RIGHT KNEE: ICD-10-CM

## 2022-08-22 PROCEDURE — 93000 ELECTROCARDIOGRAM COMPLETE: CPT | Performed by: FAMILY MEDICINE

## 2022-08-22 PROCEDURE — 99214 OFFICE O/P EST MOD 30 MIN: CPT | Performed by: FAMILY MEDICINE

## 2022-08-22 RX ORDER — LIDOCAINE 50 MG/G
1 PATCH TOPICAL DAILY
Qty: 10 PATCH | Refills: 0 | Status: SHIPPED | OUTPATIENT
Start: 2022-08-22 | End: 2022-09-01

## 2022-08-22 NOTE — PROGRESS NOTES
[facility]  Office Progress Note - Dr. Ap Moon  8/22/22    CC:   Chief Complaint   Patient presents with    Chest Pain     Pt notices heart \"fluttering\" at night. Has noticed high BP recently. Rib Pain     Intermittent right sided rib pain onset 2 months. Very bad 2 weeks ago. Knee Pain     Right knee pain - did have repair in right knee years ago. BP (!) 142/92   Pulse 81   Temp 97.9 °F (36.6 °C) (Temporal)   Ht 5' 1\" (1.549 m)   Wt 195 lb (88.5 kg)   LMP 01/03/2017   SpO2 99%   BMI 36.84 kg/m²   Wt Readings from Last 3 Encounters:   08/22/22 195 lb (88.5 kg)   06/16/22 195 lb (88.5 kg)   05/10/22 193 lb 3.2 oz (87.6 kg)       HPI:     Chest Pain  X1 mo  Feels similar to previous chest pain  Feels many times at night  Every night- wakes her up from sleep  Will happen a couple times per night  Pressure in center of chest.  The pain does not occur during the daytime. Palpitations rarely occur during the daytime. Admits to fluttering feeling on left side   Can return to sleep after readjusting position- 10-15 min  Will start out sleep on side but wake on back with pressure  Has been snoring a lot lately (per )  Blood pressure has not been as well-controlled recently. Neck 15.25\"  [place] [address]    Right Rib Pain  2 mo ongoing  2 weeks ago was very bad episode  Right sided, wraps from front to back  Hx cholecystectomy 2015? Intermittent now but not as bad  No known aggravating factory  Moving makes it feel better   Denies injuries     Right Knee Swelling  1 week  Hx meniscus surgery on R knee 2 years ago.   Had partially torn ACL at that time  Feels as though knee is swollen  Has not taken meloxicam or other meds or ice/heat, etc  Constant swelling since Wednesday   Admits to discomfort but not necessarily pain  Denies injuries  On exam she has minimal effusion medially, ligaments with firm endpoints, positive meniscus testing with internally rotated tibia plus flexion and extension. Knee has felt like it will give out on her when she is planted and twists, but this has been ongoing for some time. _________________________________________________________    Assessment / Plan  April was seen today for chest pain, rib pain and knee pain. Diagnoses and all orders for this visit:    Chest pain, unspecified type  -     EKG 12 lead; Future  -     EKG 12 lead  -     Sleep Study with PAP Titration; Future    AMA (obstructive sleep apnea)  -     Sleep Study with PAP Titration; Future    Essential hypertension  -     Sleep Study with PAP Titration; Future    Suspect above symptoms probably related to sleep apnea given that she is really only having nocturnal episodes. This would also explain less adequately controlled hypertension and increased daytime fatigue. Will schedule for split-level sleep study. EKG without worrisome findings. Not describing angina. Chest wall pain  -     lidocaine (LIDODERM) 5 %; Place 1 patch onto the skin daily for 10 days 12 hours on, 12 hours off. Chronic pain of right knee         minimal swelling without new stability with known partial ACL tear and history of meniscus surgery. Recommend compression, ice, NSAIDs for the next 1 to 2 weeks. Follow-up if not improving    Return in about 2 months (around 10/22/2022). or as scheduled. Patient counseled to follow up sooner or seek more acute care if symptoms worsening or not improving according to plan.      Electronically signed by Alyssa Murphy MD on 8/22/2022    _________________________________________________________  Current Outpatient Medications on File Prior to Visit   Medication Sig Dispense Refill    losartan (COZAAR) 25 MG tablet take 1 tablet by mouth once daily 30 tablet 5    albuterol sulfate HFA (PROAIR HFA) 108 (90 Base) MCG/ACT inhaler Inhale 2 puffs into the lungs every 6 hours as needed for Wheezing 1 each 0    cetirizine (ZYRTEC) 10 MG tablet take 1 tablet by mouth once daily 90 tablet 1    omeprazole (PRILOSEC) 40 MG delayed release capsule take 1 capsule by mouth once daily 90 capsule 1    Multiple Vitamins-Minerals (MULTIVITAMIN & MINERAL PO) Take 1 tablet by mouth daily      fluticasone (FLONASE) 50 MCG/ACT nasal spray 2 sprays by Nasal route daily 1 Bottle 2     No current facility-administered medications on file prior to visit. Patient Active Problem List   Diagnosis Code    Gastritis K29.70    HLD (hyperlipidemia) E78.5    Pre-hypertension R03.0    Toe pain, left M79.675     _________________________________________________________  Past Medical History:   Diagnosis Date    Arthritis     Asthma     Diarrhea     Diverticulosis     GERD (gastroesophageal reflux disease)     Stomach pain, controlled with omeprazole    Hyperlipidemia     not on any medications    Ocular migraine     PONV (postoperative nausea and vomiting)        Family History   Problem Relation Age of Onset    Other Mother 29        passed of brain aneurysm    High Blood Pressure Father     Other Maternal Grandfather         COPD    Cancer Paternal Grandmother 28        colon    Heart Disease Paternal Grandfather 61       Past Surgical History:   Procedure Laterality Date    ADENOIDECTOMY  0     Deedee Buckles Dr. Marlena Goodpasture, TOTAL ABDOMINAL (CERVIX REMOVED)      [place] COLONOSCOPY FLX DX W/COLLJ SPEC WHEN PFRMD N/A 2018    COLONOSCOPY DIAGNOSTIC performed by Damaso Rubalcava MD at [address] [place] [address]  2015    L4 or L5 Microdiscectomy    UPPER GASTROINTESTINAL ENDOSCOPY      resulted with alkaline reflux, Dr. Noreen Jacob History     Tobacco Use    Smoking status: Never     Passive exposure: Yes    Smokeless tobacco: Never   Vaping Use    Vaping Use: Never used   Substance Use Topics    Alcohol use:  Yes     Alcohol/week: 2.0 standard drinks     Types: 2 Glasses of wine per week     Comment: social    Drug use: No       Chart reviewed and updated where appropriate for PMH, Fam, and Soc Hx.  _________________________________________________________  ROS: POSITIVE: As in the HPI. Otherwise Pertinent negatives are negative.    __________________________________________________________  Physical Exam   Constitutional:    She is oriented to person, place, and time. She appears well-developed and well-nourished. Eyes:    Conjunctivae are normal.    Pupils are equal, round, and reactive to light. EOMI. Neck:    Normal range of motion. No thyromegaly or nodules noted. No bruit. No LAD. Cardiovascular:    Normal rate, regular rhythm and normal heart sounds. No murmur. No gallop and no friction rub. Pulmonary/Chest:    Effort normal and breath sounds normal.    No wheezes. No rales or rhonchi. Abdominal:    Soft. Bowel sounds are normal.    No distension. No tenderness. Musculoskeletal:    Normal range of motion. Right knee as in the HPI. No joint swelling noted. No peripheral edema. Skin:    Skin is warm and dry. No rashes, No lesions. Psychiatric:    She has a normal mood and affect. Normal groom and dress. No SI or HI.   ________________________________________________________    This note may have been created using dictation software.  Efforts were made to reduce errors, but some may persist.

## 2022-10-25 ENCOUNTER — HOSPITAL ENCOUNTER (OUTPATIENT)
Dept: SLEEP CENTER | Age: 47
Discharge: HOME | End: 2022-10-25
Payer: COMMERCIAL

## 2022-10-25 DIAGNOSIS — R07.9 CHEST PAIN, UNSPECIFIED TYPE: ICD-10-CM

## 2022-10-25 DIAGNOSIS — G47.33 OSA (OBSTRUCTIVE SLEEP APNEA): ICD-10-CM

## 2022-10-25 DIAGNOSIS — I10 ESSENTIAL HYPERTENSION: ICD-10-CM

## 2022-10-25 PROCEDURE — 95810 POLYSOM 6/> YRS 4/> PARAM: CPT

## 2022-10-26 VITALS — OXYGEN SATURATION: 97 % | HEIGHT: 61 IN | HEART RATE: 67 BPM | BODY MASS INDEX: 37.38 KG/M2 | WEIGHT: 198 LBS

## 2022-10-26 ASSESSMENT — SLEEP AND FATIGUE QUESTIONNAIRES
HOW LIKELY ARE YOU TO NOD OFF OR FALL ASLEEP WHILE SITTING INACTIVE IN A PUBLIC PLACE: 1
HOW LIKELY ARE YOU TO NOD OFF OR FALL ASLEEP WHEN YOU ARE A PASSENGER IN A CAR FOR AN HOUR WITHOUT A BREAK: 3
HOW LIKELY ARE YOU TO NOD OFF OR FALL ASLEEP IN A CAR, WHILE STOPPED FOR A FEW MINUTES IN TRAFFIC: 0
HOW LIKELY ARE YOU TO NOD OFF OR FALL ASLEEP WHILE SITTING AND READING: 3
ESS TOTAL SCORE: 17
HOW LIKELY ARE YOU TO NOD OFF OR FALL ASLEEP WHILE SITTING QUIETLY AFTER LUNCH WITHOUT ALCOHOL: 3
HOW LIKELY ARE YOU TO NOD OFF OR FALL ASLEEP WHILE SITTING AND TALKING TO SOMEONE: 1
HOW LIKELY ARE YOU TO NOD OFF OR FALL ASLEEP WHILE LYING DOWN TO REST IN THE AFTERNOON WHEN CIRCUMSTANCES PERMIT: 3
HOW LIKELY ARE YOU TO NOD OFF OR FALL ASLEEP WHILE WATCHING TV: 3

## 2022-10-28 ENCOUNTER — TELEPHONE (OUTPATIENT)
Dept: PULMONOLOGY | Age: 47
End: 2022-10-28

## 2022-10-28 ENCOUNTER — OFFICE VISIT (OUTPATIENT)
Dept: FAMILY MEDICINE CLINIC | Age: 47
End: 2022-10-28
Payer: COMMERCIAL

## 2022-10-28 VITALS
HEIGHT: 61 IN | WEIGHT: 198 LBS | DIASTOLIC BLOOD PRESSURE: 68 MMHG | HEART RATE: 76 BPM | TEMPERATURE: 97.5 F | SYSTOLIC BLOOD PRESSURE: 132 MMHG | BODY MASS INDEX: 37.38 KG/M2 | OXYGEN SATURATION: 100 %
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
## 2022-10-28 DIAGNOSIS — L30.9 DERMATITIS: Primary | ICD-10-CM

## 2022-10-28 PROCEDURE — 99213 OFFICE O/P EST LOW 20 MIN: CPT | Performed by: INTERNAL MEDICINE

## 2022-10-28 PROCEDURE — 96372 THER/PROPH/DIAG INJ SC/IM: CPT | Performed by: INTERNAL MEDICINE

## 2022-10-28 RX ORDER — PREDNISONE 10 MG/1
TABLET ORAL
Qty: 12 TABLET | Refills: 0 | Status: SHIPPED | OUTPATIENT
Start: 2022-10-28 | End: 2022-11-03

## 2022-10-28 RX ORDER — TRIAMCINOLONE ACETONIDE 40 MG/ML
40 INJECTION, SUSPENSION INTRA-ARTICULAR; INTRAMUSCULAR ONCE
Status: COMPLETED | OUTPATIENT
Start: 2022-10-28 | End: 2022-10-28

## 2022-10-28 RX ADMIN — TRIAMCINOLONE ACETONIDE 40 MG: 40 INJECTION, SUSPENSION INTRA-ARTICULAR; INTRAMUSCULAR at 10:10

## 2022-10-29 ASSESSMENT — ENCOUNTER SYMPTOMS
SHORTNESS OF BREATH: 0
ABDOMINAL PAIN: 0
ROS SKIN COMMENTS: SEE ABOVE
NAUSEA: 0

## 2022-10-30 NOTE — PROGRESS NOTES
[address] [place]     10/29/22  April ZAMZAM Princess : 1975 Sex: female  Age: 49 y.o. Chief Complaint   Patient presents with    Rash     Had a sleep study on Tuesday; Wednesday morning when she was driving home, she started to feel itchy and then she noticed red spots popping up on her arms and one on the back of her neck       HPI  Patient presents to express care today stating that she had a sleep study done Tuesday night. Wednesday morning she was driving home began itching and noticed red spots popping up on her arms and back of her neck. She was concerned about bedbugs. She did call the facility and notify them. States she went home and immediately took her clothes off and showered. No one else in the home has any bites. She has had no new bites. She does not feel that she has any bugs in the house at this time and has isolated it. Denies any fever or chills. Denies any bites elsewhere on the body. Denies any systemic symptoms associated with this. Review of Systems   Constitutional:  Negative for chills and fever. Respiratory:  Negative for shortness of breath. Cardiovascular:  Negative for chest pain. Gastrointestinal:  Negative for abdominal pain and nausea. Skin:         See above   Neurological:  Negative for light-headedness and headaches. REST OF PERTINENT ROS GONE OVER AND WAS NEGATIVE. Current Outpatient Medications:     predniSONE (DELTASONE) 10 MG tablet, Take 3 tablets by mouth daily for 2 days, THEN 2 tablets daily for 2 days, THEN 1 tablet daily for 2 days. , Disp: 12 tablet, Rfl: 0    losartan (COZAAR) 25 MG tablet, take 1 tablet by mouth once daily, Disp: 30 tablet, Rfl: 5    albuterol sulfate HFA (PROAIR HFA) 108 (90 Base) MCG/ACT inhaler, Inhale 2 puffs into the lungs every 6 hours as needed for Wheezing, Disp: 1 each, Rfl: 0    cetirizine (ZYRTEC) 10 MG tablet, take 1 tablet by mouth once daily, Disp: 90 tablet, Rfl: 1 omeprazole (PRILOSEC) 40 MG delayed release capsule, take 1 capsule by mouth once daily, Disp: 90 capsule, Rfl: 1    Multiple Vitamins-Minerals (MULTIVITAMIN & MINERAL PO), Take 1 tablet by mouth daily, Disp: , Rfl:     fluticasone (FLONASE) 50 MCG/ACT nasal spray, 2 sprays by Nasal route daily, Disp: 1 Bottle, Rfl: 2  Allergies   Allergen Reactions    Seasonal     Sucralfate     Levaquin [Levofloxacin In D5w]     Reglan [Metoclopramide] Anxiety       Past Medical History:   Diagnosis Date    Arthritis     Asthma     Diarrhea     Diverticulosis     GERD (gastroesophageal reflux disease)     Stomach pain, controlled with omeprazole    Hyperlipidemia     not on any medications    Ocular migraine     PONV (postoperative nausea and vomiting)      Past Surgical History:   Procedure Laterality Date    Mendoza Jean, TOTAL ABDOMINAL (CERVIX REMOVED)      RI COLONOSCOPY FLX DX W/COLLJ SPEC WHEN PFRMD N/A 9/17/2018    COLONOSCOPY DIAGNOSTIC performed by Matt Farusto MD at [address] [place] [address]  03/2015    L4 or L5 Microdiscectomy    UPPER GASTROINTESTINAL ENDOSCOPY  2012    resulted with alkaline reflux, Dr. Juan F Christine     Family History   Problem Relation Age of Onset    Other Mother 29        passed of brain aneurysm    High Blood Pressure Father     Other Maternal Grandfather         COPD    Cancer Paternal Grandmother 28        colon    Heart Disease Paternal Grandfather 61     Social History     Socioeconomic History    Marital status:      Spouse name: Not on file    Number of children: Not on file    Years of education: Not on file    Highest education level: Not on file   Occupational History    Not on file   Tobacco Use    Smoking status: Never     Passive exposure: Yes    Smokeless tobacco: Never   Vaping Use    Vaping Use: Never used   Substance and Sexual Activity    Alcohol use:  Yes     Alcohol/week: 2.0 standard drinks     Types: 2 Glasses of wine per week     Comment: social    Drug use: No    Sexual activity: Not on file   Other Topics Concern    Not on file   Social History Narrative    Not on file     Social Determinants of Health     Financial Resource Strain: Not on file   Food Insecurity: Not on file   Transportation Needs: Not on file   Physical Activity: Not on file   Stress: Not on file   Social Connections: Not on file   Intimate Partner Violence: Not on file   Housing Stability: Not on file       Vitals:    10/28/22 0954   BP: 132/68   Pulse: 76   Temp: 97.5 °F (36.4 °C)   TempSrc: Temporal   SpO2: 100%   Weight: 198 lb (89.8 kg)   Height: 5' 1\" (1.549 m)       Physical Exam  Vitals and nursing note reviewed. Constitutional:       General: She is not in acute distress. Skin:     General: Skin is warm and dry. Findings: Rash present. Comments: Patient had several raised erythematous lesions appearing like bites to the arms and back of neck. Had nothing elsewhere. No open areas. Psychiatric:      Comments: Anxious. Assessment and Plan:  April was seen today for rash. Diagnoses and all orders for this visit:    Dermatitis    Bug bite, initial encounter    Other orders  -     predniSONE (DELTASONE) 10 MG tablet; Take 3 tablets by mouth daily for 2 days, THEN 2 tablets daily for 2 days, THEN 1 tablet daily for 2 days. -     triamcinolone acetonide (KENALOG-40) injection 40 mg    Then: As stated she has had no new bites over the last 48 hours. No one else in the family has anything. I told her she should observe carefully notify us if not improving. If any further bites she should have home inspected and fumigated if necessary. .  I did offer literature on bedbugs which she declined at this time. Follow-up with PCP. Notify us of problems in the interim. Return for fu pcp. Seen By:  Shahid Maldonado MD      *Document was created using voice recognition software. Note was reviewed however may contain grammatical errors.

## 2022-11-07 DIAGNOSIS — K21.9 GASTROESOPHAGEAL REFLUX DISEASE, UNSPECIFIED WHETHER ESOPHAGITIS PRESENT: ICD-10-CM

## 2022-11-08 RX ORDER — OMEPRAZOLE 40 MG/1
CAPSULE, DELAYED RELEASE ORAL
Qty: 90 CAPSULE | Refills: 1 | Status: SHIPPED | OUTPATIENT
Start: 2022-11-08

## 2022-11-09 PROCEDURE — 95810 POLYSOM 6/> YRS 4/> PARAM: CPT | Performed by: INTERNAL MEDICINE

## 2022-11-09 NOTE — PROGRESS NOTES
[address] [address]    SLEEP STUDY REPORT     PATIENT NAME: Barbara SMITH                        : 1975  MED REC NO:   [id number]                          ACCOUNT NO: [de-identified]                              PROVIDER:     Sienna Ricardo MD     DATE OF STUDY: 10/25/2022     FULL NIGHT POLYSOMNOGRAM REPORT     LOCATION:  [address] [place] [address]     REFERRING PROVIDER: Dr. Shirley Nichole    AGE: 49 yrs      SEX: Female          HEIGHT: 5 ft 1 in         WEIGHT: 198 lbs          BMI: 37.4 kg/m2    NECK CIRCUMFERENCE: 15.25 in    Symptoms: Excessive daytime sleepiness, nocturnal level/gasping, loud snoring, restless sleep, morning headaches, nocturnal sweating, leg jerks, heartburn, teeth grinding, waking with heart pounding, waking confused, difficulty maintaining sleep, falling asleep on the phone and in conversation, frequent awakenings. The [place] Sleepiness Scale was 17 out of 24 (scores above or equal to 10 are suggestive of hypersomnolence). Indication: Suspected obstructive sleep apnea. Medical History: Obesity, hypertension, asthma, arthritis, anxiety, GERD. Medications: Cozaar, Prilosec, Zyrtec. DESCRIPTION: This full night polysomnogram consisted of EEG, EOG, EMG and 2-lead ECG monitoring. Oronasal airflow (nasal pressure transducer and thermistor), chest and abdominal efforts by respiratory inductance plethysmography or polyvinylidene fluoride (PVDF) sensor, and pulse oximetry were monitored as well.  Hypopneas were scored as at least a 30% reduction in amplitude of the semiquantitative flow signal associated with a 3% or greater oxygen desaturation and/or an EEG microarousal.  Respiratory effort related arousals were scored as a sequence of breaths lasting at least 10 seconds characterized by increased respiratory effort or decreased inspiratory airflow associated with an EEG microarousal, not otherwise meeting criteria for an apnea or hypopnea. FINDINGS:  SLEEP CONTINUITY AND SLEEP ARCHITECTURE:  Lights were turned off at 10:14 PM and lights were turned on at 5:18 AM. Total recording time was 424 minutes and total sleep time was 390 minutes. The overall sleep efficiency was normal at 92%. Sleep onset latency was normal at 11 minutes and REM latency was slightly increased to 132 minutes. There were 20 brief awakenings during this study. The duration of wakefulness after sleep onset was 22 minutes. The amount of N1 sleep was 10.5% of total sleep time, or 41 minutes. The amount of N2 sleep was 52.5% of total sleep time, or 205 minutes. The amount of REM sleep was 21% of total sleep time, or 83 minutes. Slow wave sleep was 16% of total sleep time, or 62 minutes. The microarousal index was slightly increased at 16. RESPIRATORY MONITORING:  This study documented 0 obstructive apneas, 0 central apneas, 0 mixed apneas, 85 hypopneas, and 0 respiratory effort related arousals (RERAs) during the total recorded sleep time. The overall apnea/hypopnea index (AHI) was 13. The overall respiratory disturbance index (RDI includes RERAs) was 13. The non-REM RDI was 5.6 and the REM RDI was 40.5. The patient slept supine and laterally, with respiratory events noted in all positions. REM sleep occurred in the supine and nonsupine position. The average oxyhemoglobin saturation was 94% while awake, 93% during non-REM sleep and 93% during REM sleep. The overall 3% oxygen desaturation index during sleep was 10.4. The lowest oxygen saturation during sleep was 88%. Oxygen saturations were less than or equal to 88% for less than 1 minute of the total sleep time. Moderate snoring was noted. EEG: No abnormal epileptiform activity was observed. ECG: The electrocardiogram documented normal sinus rhythm. The average heart rate during sleep was 72 beats per minute.      PERIODIC LIMB MOVEMENTS: Occasional periodic limb movements were noted. EMG/VIDEO MONITORING: Loss of REM atonia, bruxism, and parasomnias were not observed. IMPRESSION:   1. This study is consistent with mild obstructive sleep apnea that occurs predominantly in REM sleep. 2.  A split night study was not performed due to the mild nature of this patient's sleep disordered breathing. 3.  Occasional periodic limb movements were noted. RECOMMENDATIONS:    1. Treatment for mild obstructive sleep apnea should be based on the patient's symptoms and comorbid conditions. Treatment options include CPAP therapy, oral appliance therapy, ENT evaluation, and/or weight loss efforts. These options will be discussed with the patient during her follow-up in the [address] [place] [address]. If she opts for CPAP therapy, she would be a good candidate for empiric auto CPAP. 2.  The patient should be strongly counseled against driving while drowsy. 3.  Weight loss efforts should be encouraged, as the severity of obstructive sleep apnea may improve although no guarantee of cure can be made. 4.  Clinical correlation the periodic limb movements noted on this study is recommended. It is possible they will improve with successful management of sleep disordered breathing.

## 2022-11-11 ENCOUNTER — TELEPHONE (OUTPATIENT)
Dept: SLEEP MEDICINE | Age: 47
End: 2022-11-11

## 2022-11-11 NOTE — TELEPHONE ENCOUNTER
Discussed results of SS with Pt (mild AMA). Dr does recommend pt start on CPAP therapy. Pt is amendable to this. She would like to use [facility] for her DM co.  Will send all necessary information to [facility]. Explained insurance mask interface and compliance policies. Appt made for 2/15.

## 2022-11-16 DIAGNOSIS — G47.33 OSA (OBSTRUCTIVE SLEEP APNEA): Primary | ICD-10-CM

## 2022-11-16 NOTE — PROGRESS NOTES
After discussion of PSG results, patient has opted to begin auto CPAP therapy. Auto CPAP 5 to 15 cm of water has been ordered and will be sent to preferred DME. She will be reminded of insurance requirements for compliance and 30-day window to exchange mask interface. She will follow up in clinic within 3 months.     Shahid Wolff MD

## 2022-12-07 ENCOUNTER — OFFICE VISIT (OUTPATIENT)
Dept: FAMILY MEDICINE CLINIC | Age: 47
End: 2022-12-07
Payer: COMMERCIAL

## 2022-12-07 VITALS
RESPIRATION RATE: 15 BRPM | SYSTOLIC BLOOD PRESSURE: 122 MMHG | WEIGHT: 198 LBS | DIASTOLIC BLOOD PRESSURE: 76 MMHG | HEIGHT: 61 IN | HEART RATE: 89 BPM | BODY MASS INDEX: 37.38 KG/M2 | OXYGEN SATURATION: 98 % | TEMPERATURE: 97.3 F

## 2022-12-07 DIAGNOSIS — J06.9 URI WITH COUGH AND CONGESTION: Primary | ICD-10-CM

## 2022-12-07 LAB
INFLUENZA A ANTIBODY: NORMAL
INFLUENZA B ANTIBODY: NORMAL
Lab: NORMAL
PERFORMING INSTRUMENT: NORMAL
QC PASS/FAIL: NORMAL
SARS-COV-2, POC: NORMAL

## 2022-12-07 PROCEDURE — 87426 SARSCOV CORONAVIRUS AG IA: CPT | Performed by: FAMILY MEDICINE

## 2022-12-07 PROCEDURE — 87804 INFLUENZA ASSAY W/OPTIC: CPT | Performed by: FAMILY MEDICINE

## 2022-12-07 PROCEDURE — 99213 OFFICE O/P EST LOW 20 MIN: CPT | Performed by: FAMILY MEDICINE

## 2022-12-07 RX ORDER — DOXYCYCLINE HYCLATE 100 MG
100 TABLET ORAL 2 TIMES DAILY
Qty: 20 TABLET | Refills: 0 | Status: SHIPPED | OUTPATIENT
Start: 2022-12-07 | End: 2022-12-17

## 2022-12-07 RX ORDER — PREDNISONE 10 MG/1
TABLET ORAL
Qty: 30 TABLET | Refills: 0 | Status: SHIPPED | OUTPATIENT
Start: 2022-12-07

## 2022-12-07 NOTE — PROGRESS NOTES
[facility] [place]    April Diomedes Wang presents to the office today for   Chief Complaint   Patient presents with    Otalgia    Pharyngitis    Cough     Cough and congestion  X 3 weeks  Treated for ear infection with Amoxicillin and Mucinex DM  +Sweats  No known fever as does not have thermometer      Review of Systems   Constitutional:  Negative for chills and fever. Genitourinary:  Negative for dysuria, hematuria and urgency. Skin:  Negative for rash. Neurological:  Negative for dizziness and light-headedness. /76   Pulse 89   Temp 97.3 °F (36.3 °C) (Temporal)   Resp 15   Ht 5' 1\" (1.549 m)   Wt 198 lb (89.8 kg)   LMP 01/03/2017   SpO2 98%   BMI 37.41 kg/m²   Physical Exam  Constitutional:       Appearance: Normal appearance. HENT:      Head: Normocephalic and atraumatic. Nose: Congestion present. Eyes:      Extraocular Movements: Extraocular movements intact. Conjunctiva/sclera: Conjunctivae normal.   Cardiovascular:      Rate and Rhythm: Normal rate. Heart sounds: Normal heart sounds. Pulmonary:      Effort: Pulmonary effort is normal.      Breath sounds: Normal breath sounds. Skin:     General: Skin is warm. Neurological:      Mental Status: She is alert and oriented to person, place, and time.    Psychiatric:         Mood and Affect: Mood normal.         Behavior: Behavior normal.          Current Outpatient Medications:     doxycycline hyclate (VIBRA-TABS) 100 MG tablet, Take 1 tablet by mouth 2 times daily for 10 days, Disp: 20 tablet, Rfl: 0    predniSONE (DELTASONE) 10 MG tablet, Take 4 tabs po qd x 3 days, then take 3 tabs po qd x 3 days, then take 2 tabs po qd x 3 days, then take 1 tab po qd x 3 days per day, then stop, Disp: 30 tablet, Rfl: 0    omeprazole (PRILOSEC) 40 MG delayed release capsule, take 1 capsule by mouth once daily, Disp: 90 capsule, Rfl: 1    losartan (COZAAR) 25 MG tablet, take 1 tablet by mouth once daily, Disp: 30 tablet, Rfl: 5 albuterol sulfate HFA (PROAIR HFA) 108 (90 Base) MCG/ACT inhaler, Inhale 2 puffs into the lungs every 6 hours as needed for Wheezing, Disp: 1 each, Rfl: 0    cetirizine (ZYRTEC) 10 MG tablet, take 1 tablet by mouth once daily, Disp: 90 tablet, Rfl: 1    Multiple Vitamins-Minerals (MULTIVITAMIN & MINERAL PO), Take 1 tablet by mouth daily, Disp: , Rfl:     fluticasone (FLONASE) 50 MCG/ACT nasal spray, 2 sprays by Nasal route daily, Disp: 1 Bottle, Rfl: 2     Past Medical History:   Diagnosis Date    Arthritis     Asthma     Diarrhea     Diverticulosis     GERD (gastroesophageal reflux disease)     Stomach pain, controlled with omeprazole    Hyperlipidemia     not on any medications    Ocular migraine     PONV (postoperative nausea and vomiting)        April was seen today for otalgia, pharyngitis and cough. Diagnoses and all orders for this visit:    URI with cough and congestion  -     doxycycline hyclate (VIBRA-TABS) 100 MG tablet; Take 1 tablet by mouth 2 times daily for 10 days  -     predniSONE (DELTASONE) 10 MG tablet;  Take 4 tabs po qd x 3 days, then take 3 tabs po qd x 3 days, then take 2 tabs po qd x 3 days, then take 1 tab po qd x 3 days per day, then stop  -     POCT Influenza A/B  -     POCT COVID-19, Antigen         Jg Wolfe MD

## 2022-12-15 DIAGNOSIS — J30.1 CHRONIC SEASONAL ALLERGIC RHINITIS DUE TO POLLEN: ICD-10-CM

## 2022-12-15 RX ORDER — CETIRIZINE HYDROCHLORIDE 10 MG/1
TABLET ORAL
Qty: 90 TABLET | Refills: 1 | Status: SHIPPED | OUTPATIENT
Start: 2022-12-15

## 2022-12-15 NOTE — TELEPHONE ENCOUNTER
Last Appointment:  8/22/2022  Future Appointments   Date Time Provider Eugene Causey   2/15/2023  1:30 PM Carmela Beryle Sage, MD [facility]      r

## 2023-01-31 NOTE — PROGRESS NOTES
Paternal Grandmother 28        colon    Heart Disease Paternal Grandfather 61       Past Surgical History:   Procedure Laterality Date    ADENOIDECTOMY  0     SECTION     Ramosketty Orantes    HYSTERECTOMY, TOTAL ABDOMINAL      MD COLONOSCOPY FLX DX W/COLLJ [address] WHEN PFRMD N/A 2018    COLONOSCOPY DIAGNOSTIC performed by Elena Sanchez MD at [address] [facility]  2015    L4 or L5 Microdiscectomy    UPPER GASTROINTESTINAL ENDOSCOPY      resulted with alkaline reflux, Dr. Kemar Holloway History   Substance Use Topics    Smoking status: Never Smoker    Smokeless tobacco: Never Used    Alcohol use 1.2 oz/week     2 Glasses of wine per week      Comment: social       ROS:  No CP, No palpitations,   No sob, No cough,   No abd pain, No heartburn,   No headaches, Positive fatigue  No tingling, No numbness, No weakness,   No bowel changes, No hematochezia, No melena,  No bladder changes, No hematuria  No skin rashes, No skin lesions. No vision changes, No hearing changes,   No polyuria, polydipsia, polyphagia. Stable mood. ROS otherwise negative unless as listed in HPI. Chart reviewed and updated where appropriate for PMH, Fam, and Soc Hx. Physical Exam   /80 (Site: Right Upper Arm, Position: Sitting, Cuff Size: Medium Adult)   Pulse 70   Temp 97.7 °F (36.5 °C) (Oral)   Wt 177 lb (80.3 kg)   LMP  (Approximate)   SpO2 97%   BMI 33.44 kg/m²   Wt Readings from Last 3 Encounters:   10/22/18 177 lb (80.3 kg)   18 172 lb (78 kg)   18 174 lb (78.9 kg)       Constitutional:    She is oriented to person, place, and time. She appears well-developed and well-nourished. HENT:    Nose: Nose normal.    Mouth/Throat: Oropharynx is clear and moist. Somewhat crowded oropharynx   Eyes:    Conjunctivae are normal.    Pupils are equal, round, and reactive to light. EOMI. Neck:    Normal range of motion.     No thyromegaly or nodules noted. No bruit. 14 inch neck circumference. No adenopathy. Cardiovascular:    Normal rate, regular rhythm and normal heart sounds. No murmur. No gallop and no friction rub. Pulmonary/Chest:    Effort normal and breath sounds normal.    No wheezes. No rales or rhonchi. Abdominal:    Soft. Bowel sounds are normal.    No distension. No tenderness. Musculoskeletal:    Normal range of motion. tenderness at the lateral upper condyle of the right elbow. Minimally positive impingement signs at the right shoulder. Resisted finger extension, wrist extension and supination cause pain at the lateral upper condyle of the right elbow. No joint swelling noted. No peripheral edema. Psychiatric:    She has a normal mood and affect. Normal groom and dress. Current Outpatient Prescriptions on File Prior to Visit   Medication Sig Dispense Refill    fluticasone (FLONASE) 50 MCG/ACT nasal spray 2 sprays by Nasal route daily 1 Bottle 2    omeprazole (PRILOSEC) 20 MG delayed release capsule Take 20 mg by mouth as needed Instructed to take am of procedure if needed      cetirizine (ZYRTEC) 10 MG tablet Take 1 tablet by mouth daily For allergies 30 tablet 5    Multiple Vitamins-Minerals (THERAPEUTIC MULTIVITAMIN-MINERALS) tablet Take 1 tablet by mouth daily Ld 9/11/2018       No current facility-administered medications on file prior to visit. Patient Active Problem List   Diagnosis Code    Gastritis K29.70    HLD (hyperlipidemia) E78.5    Pre-hypertension R03.0    Toe pain, left M79.675        Assessment / Plan  April was seen today for sleep apnea and shoulder pain. Diagnoses and all orders for this visit:    AMA (obstructive sleep apnea), new diagnosis, currently a clinical diagnosis. Schedule for sleep study to confirm initiate therapy.   Sleepiness scale completed  Neck circumference 14 inches  Daytime fatigue, witnessed apnea, palpitations at night, increasing blood Information: This plan will allow you to select a body location and will not render the procedure on the note output. It will note the location you select in the morphology. Detail Level: Simple

## 2023-02-07 RX ORDER — LOSARTAN POTASSIUM 25 MG/1
TABLET ORAL
Qty: 90 TABLET | Refills: 1 | Status: SHIPPED | OUTPATIENT
Start: 2023-02-07

## 2023-02-07 NOTE — TELEPHONE ENCOUNTER
Last Appointment:  8/22/2022  Future Appointments   Date Time Provider Eugene Causey   2/15/2023  1:30 PM MD BIRGIT Magdaleno Replaced by [facility]

## 2023-02-15 ENCOUNTER — OFFICE VISIT (OUTPATIENT)
Dept: SLEEP MEDICINE | Age: 48
End: 2023-02-15
Payer: COMMERCIAL

## 2023-02-15 VITALS
WEIGHT: 201.4 LBS | SYSTOLIC BLOOD PRESSURE: 145 MMHG | OXYGEN SATURATION: 98 % | BODY MASS INDEX: 38.02 KG/M2 | HEIGHT: 61 IN | RESPIRATION RATE: 14 BRPM | HEART RATE: 71 BPM | DIASTOLIC BLOOD PRESSURE: 94 MMHG

## 2023-02-15 DIAGNOSIS — E66.9 OBESITY, UNSPECIFIED CLASSIFICATION, UNSPECIFIED OBESITY TYPE, UNSPECIFIED WHETHER SERIOUS COMORBIDITY PRESENT: ICD-10-CM

## 2023-02-15 DIAGNOSIS — G47.33 OSA (OBSTRUCTIVE SLEEP APNEA): Primary | ICD-10-CM

## 2023-02-15 DIAGNOSIS — G47.19 EXCESSIVE DAYTIME SLEEPINESS: ICD-10-CM

## 2023-02-15 PROCEDURE — 99203 OFFICE O/P NEW LOW 30 MIN: CPT | Performed by: INTERNAL MEDICINE

## 2023-02-15 ASSESSMENT — SLEEP AND FATIGUE QUESTIONNAIRES
ESS TOTAL SCORE: 16
HOW LIKELY ARE YOU TO NOD OFF OR FALL ASLEEP WHILE LYING DOWN TO REST IN THE AFTERNOON WHEN CIRCUMSTANCES PERMIT: 3
HOW LIKELY ARE YOU TO NOD OFF OR FALL ASLEEP WHILE SITTING QUIETLY AFTER LUNCH WITHOUT ALCOHOL: 2
HOW LIKELY ARE YOU TO NOD OFF OR FALL ASLEEP WHILE SITTING AND READING: 2
HOW LIKELY ARE YOU TO NOD OFF OR FALL ASLEEP IN A CAR, WHILE STOPPED FOR A FEW MINUTES IN TRAFFIC: 0
HOW LIKELY ARE YOU TO NOD OFF OR FALL ASLEEP WHEN YOU ARE A PASSENGER IN A CAR FOR AN HOUR WITHOUT A BREAK: 3
HOW LIKELY ARE YOU TO NOD OFF OR FALL ASLEEP WHILE SITTING AND TALKING TO SOMEONE: 1
HOW LIKELY ARE YOU TO NOD OFF OR FALL ASLEEP WHILE WATCHING TV: 3
HOW LIKELY ARE YOU TO NOD OFF OR FALL ASLEEP WHILE SITTING INACTIVE IN A PUBLIC PLACE: 2

## 2023-02-15 ASSESSMENT — ENCOUNTER SYMPTOMS
TROUBLE SWALLOWING: 0
SHORTNESS OF BREATH: 0

## 2023-02-15 NOTE — PROGRESS NOTES
[facility] Sleep Medicine    Patient Name: Christal Song  Age: 49 y.o.   : 1975    Date of Visit: 2/15/23      HPI   April Suyapa Gilliam is a 49 y.o. obese woman with HTN, GERD, asthma, and mild, REM-predominant AMA who presents as a new patient to Sleep Clinic, referred by her PCP Dr. Alfa Herndon, to review CPAP adherence and efficacy. She was diagnosed with mild, REM predominant AMA in Oct 2022 (AHI 13, REM RDI 41, non-REM RDI 5.6, no significant positionality, 3% MARYBEL 10, avg SpO2 93%, SpO2 mikal 88%, very briefly). She opted to try auto CPAP therapy, and received her device via [facility] on 2022. The study was ordered by her PCP in part due to HTN (currently treated with losartan 25 mg) as well as patient-reported symptoms of fatigue and nocturnal choking/gasping. She was unable to tolerate the CPAP for more than 1.5 to 2 hours per night in the first few weeks of trying it, saying she would wake up feeling like she was being choked and/or with significant dry mouth, and pulled the mask off. She has been using an under the nose fullface mask. She tried using it briefly over the last few nights, but prior to that had not used since the beginning of January. Her weight has been stable for some time. [place] Sleepiness Scale score of 16/24 (scores of 10 or higher are considered indicative of excessive daytime sleepiness)  .   Sleep Medicine 2/15/2023 10/26/2022   Sitting and reading 2 3   Watching TV 3 3   Sitting, inactive in a public place (e.g. a theatre or a meeting) 2 1   As a passenger in a car for an hour without a break 3 3   Lying down to rest in the afternoon when circumstances permit 3 3   Sitting and talking to someone 1 1   Sitting quietly after a lunch without alcohol 2 3   In a car, while stopped for a few minutes in traffic 0 0   [place] Sleepiness Score 16 17   Neck circumference (Inches) - 15.25       [facility] Depression Scale (NIMISHA-D) not completed today (scores of 16 or higher may be suggestive of depressive or mood-related issues). PMH:  Past Medical History:   Diagnosis Date    Arthritis     Asthma     Diarrhea     Diverticulosis     GERD (gastroesophageal reflux disease)     Stomach pain, controlled with omeprazole    Hyperlipidemia     not on any medications    Ocular migraine     PONV (postoperative nausea and vomiting)         PSH:  Past Surgical History:   Procedure Laterality Date    [facility]      Dr. Gurjit Ruiz, TOTAL ABDOMINAL (CERVIX REMOVED)      [place] COLONOSCOPY FLX DX W/COLLJ SPEC WHEN PFRMD N/A 9/17/2018    COLONOSCOPY DIAGNOSTIC performed by Lisa White MD at [address] [place] [address]  03/2015    L4 or L5 Microdiscectomy    UPPER GASTROINTESTINAL ENDOSCOPY  2012    resulted with alkaline reflux, Dr. Horacio Read      No hx of adenotonsillectomy    Soc Hx:  Social History     Tobacco Use    Smoking status: Never     Passive exposure: Yes    Smokeless tobacco: Never   Vaping Use    Vaping Use: Never used   Substance Use Topics    Alcohol use:  Yes     Alcohol/week: 2.0 standard drinks     Types: 2 Glasses of wine per week     Comment: social    Drug use: No        Fam Hx:  History of AMA - grandmother and uncles with AMA  Family History   Problem Relation Age of Onset    Other Mother 29        passed of brain aneurysm    High Blood Pressure Father     Other Maternal Grandfather         COPD    Cancer Paternal Grandmother 28        colon    Heart Disease Paternal Grandfather 61        Current Outpatient Medications   Medication Sig Dispense Refill    losartan (COZAAR) 25 MG tablet take 1 tablet by mouth once daily 90 tablet 1    cetirizine (ZYRTEC) 10 MG tablet take 1 tablet by mouth once daily 90 tablet 1    omeprazole (PRILOSEC) 40 MG delayed release capsule take 1 capsule by mouth once daily 90 capsule 1    albuterol sulfate HFA (PROAIR HFA) 108 (90 Base) MCG/ACT inhaler Inhale 2 puffs into the lungs every 6 hours as needed for Wheezing 1 each 0    Multiple Vitamins-Minerals (MULTIVITAMIN & MINERAL PO) Take 1 tablet by mouth daily      fluticasone (FLONASE) 50 MCG/ACT nasal spray 2 sprays by Nasal route daily (Patient taking differently: 2 sprays by Nasal route as needed Rarely uses) 1 Bottle 2     No current facility-administered medications for this visit. Review of Systems  Review of Systems   Constitutional:  Positive for fatigue. Negative for unexpected weight change. HENT:  Negative for postnasal drip and trouble swallowing. No dentures   Respiratory:  Negative for shortness of breath. Cardiovascular:  Negative for palpitations and leg swelling. Gastrointestinal:         +GERD/reflux controlled on PPI   Genitourinary:         No nocturia   Musculoskeletal:         No musculoskeletal pain affecting sleep   Neurological:  Negative for headaches. Psychiatric/Behavioral:  Positive for sleep disturbance. Negative for dysphoric mood. The patient is nervous/anxious. Objective:   Physical Exam  BP (!) 145/94 (Site: Left Upper Arm, Position: Sitting, Cuff Size: Large Adult)   Pulse 71   Resp 14   Ht 5' 1\" (1.549 m)   Wt 201 lb 6.4 oz (91.4 kg)   LMP 01/03/2017   SpO2 98%   BMI 38.05 kg/m²      There were no vitals filed for this visit. Physical Exam  Constitutional:       Comments: Obese  female, alert and interactive   HENT:      Nose: No nasal deformity. Mouth/Throat:      Dentition: Normal dentition. Does not have dentures. Pharynx: No oropharyngeal exudate. Comments: Mallampati IV, tonsils not visible  Eyes:      Conjunctiva/sclera: Conjunctivae normal.      Pupils: Pupils are equal, round, and reactive to light. Cardiovascular:      Rate and Rhythm: Normal rate and regular rhythm. Heart sounds: Normal heart sounds.    Pulmonary:      Effort: Pulmonary effort is normal. Breath sounds: Normal breath sounds. Musculoskeletal:      Cervical back: Normal range of motion and neck supple. Neurological:      Mental Status: She is alert and oriented to person, place, and time. Psychiatric:         Mood and Affect: Mood normal.         Behavior: Behavior normal.         PROCEDURE HISTORY  1. PSG 10/25/2022 at [facility] (wt 198 lb):  min,  min, AHI 13, RDI 13, REM RDI 41, non-REM RDI 5.6, no significant positionality, 3% MARYBEL 10, avg SpO2 93%, SpO2 mikal 88%, very briefly)    CPAP Compliance Download -- accessed via [employer] 2/15/2023  Set up date: 12/21/22        PERTINENT LAB RESULTS  TSH   Date Value Ref Range Status   11/24/2020 2.030 0.270 - 4.200 uIU/mL Final      No results found for: FERRITIN     Assessment & Plan:   April D Milind Bowers is a 49 y.o. obese woman with HTN, GERD, asthma, and mild, REM-predominant AMA who presents as a new patient to Sleep Clinic to review CPAP adherence and efficacy. She demonstrates poor adherence thus far due to mask discomfort/claustrophobia. However, she is motivated to improve adherence and would like to try a nasal interface. 1. Obstructive Sleep Apnea     Based on sleep study results, review of device remote download, and discussion with patient, will continue auto-CPAP therapy at empiric settings of 5-15 cm of water. Will narrow pressure range once usage has increased/more data is available. DME is [facility]. Patient is using a fullface mask. Reports claustrophobia and discomfort. Would like to try nasal interface (may require a chinstrap if found to be a significant mouth breather). Order placed today. Offered referral to Dr. Dimitrios Pierce virtual psychology program for CPAP desensitization program.  Patient declined but may consider this in the future.   Discussed pathophysiology of AMA and its impact on daily well-being, as well as cardiometabolic and neurocognitive health (particularly in moderate-severe cases). Discussed CPAP as first-line and gold-standard therapy for AMA. Patient understands that CPAP should be worn every night for the duration of the night (in order to not miss therapy during early-morning REM period) for maximum benefit. She has not yet met minimum requirements for compliance (>70% of nights for >/=4 hours nightly in first 90 days). Advised to contact our clinic should her DME indicate potential revoking of her device; may submit  restart order given mask discomfort to this point. Counseled on risks of driving while drowsy. 2. Excessive Daytime Sleepiness     Elevated [place] 16/24. Likely in part secondary to untreated AMA, as above. Will assess for improvement with PAP therapy. Counseled on risks of driving while drowsy. 3. Obesity (Body mass index is 38.05 kg/m².)     Discussed impact of weight gain on AMA severity. Patient understands that AMA severity may improve with weight loss but no guarantee of cure can be made. Encouraged weight loss efforts. Patient will follow up in 3 months. A total of 35 minutes' time was spent with the patient, of which > 50% was spent in face-to-face discussion and counseling.      Wendy Guerra MD

## 2023-04-07 ENCOUNTER — OFFICE VISIT (OUTPATIENT)
Dept: FAMILY MEDICINE CLINIC | Age: 48
End: 2023-04-07
Payer: COMMERCIAL

## 2023-04-07 VITALS
HEART RATE: 84 BPM | OXYGEN SATURATION: 98 % | BODY MASS INDEX: 37 KG/M2 | HEIGHT: 61 IN | DIASTOLIC BLOOD PRESSURE: 80 MMHG | TEMPERATURE: 98.2 F | SYSTOLIC BLOOD PRESSURE: 126 MMHG | WEIGHT: 196 LBS

## 2023-04-07 DIAGNOSIS — J06.9 VIRAL URI: Primary | ICD-10-CM

## 2023-04-07 DIAGNOSIS — H65.93 BILATERAL OTITIS MEDIA WITH EFFUSION: ICD-10-CM

## 2023-04-07 DIAGNOSIS — J45.909 REACTIVE AIRWAY DISEASE WITHOUT COMPLICATION, UNSPECIFIED ASTHMA SEVERITY, UNSPECIFIED WHETHER PERSISTENT: ICD-10-CM

## 2023-04-07 PROCEDURE — 99213 OFFICE O/P EST LOW 20 MIN: CPT | Performed by: FAMILY MEDICINE

## 2023-04-07 RX ORDER — FLUTICASONE PROPIONATE 50 MCG
2 SPRAY, SUSPENSION (ML) NASAL DAILY
Qty: 1 EACH | Refills: 2 | Status: SHIPPED | OUTPATIENT
Start: 2023-04-07

## 2023-04-07 RX ORDER — FLUTICASONE PROPIONATE 50 MCG
2 SPRAY, SUSPENSION (ML) NASAL DAILY
Qty: 1 EACH | Refills: 2 | Status: CANCELLED | OUTPATIENT
Start: 2023-04-07

## 2023-04-07 RX ORDER — ALBUTEROL SULFATE 90 UG/1
2 AEROSOL, METERED RESPIRATORY (INHALATION) 4 TIMES DAILY PRN
Qty: 18 G | Refills: 0 | Status: SHIPPED | OUTPATIENT
Start: 2023-04-07

## 2023-04-07 RX ORDER — AZELASTINE 1 MG/ML
1 SPRAY, METERED NASAL 2 TIMES DAILY
Qty: 30 ML | Refills: 0 | Status: SHIPPED | OUTPATIENT
Start: 2023-04-07

## 2023-04-07 RX ORDER — ALBUTEROL SULFATE 90 UG/1
2 AEROSOL, METERED RESPIRATORY (INHALATION) EVERY 6 HOURS PRN
Qty: 1 EACH | Refills: 0 | Status: CANCELLED | OUTPATIENT
Start: 2023-04-07 | End: 2023-04-14

## 2023-04-07 ASSESSMENT — PATIENT HEALTH QUESTIONNAIRE - PHQ9
SUM OF ALL RESPONSES TO PHQ QUESTIONS 1-9: 0
SUM OF ALL RESPONSES TO PHQ QUESTIONS 1-9: 0
SUM OF ALL RESPONSES TO PHQ9 QUESTIONS 1 & 2: 0
2. FEELING DOWN, DEPRESSED OR HOPELESS: 0
SUM OF ALL RESPONSES TO PHQ QUESTIONS 1-9: 0
SUM OF ALL RESPONSES TO PHQ QUESTIONS 1-9: 0
1. LITTLE INTEREST OR PLEASURE IN DOING THINGS: 0

## 2023-04-07 NOTE — PROGRESS NOTES
40 MG delayed release capsule take 1 capsule by mouth once daily 90 capsule 1    albuterol sulfate HFA (PROAIR HFA) 108 (90 Base) MCG/ACT inhaler Inhale 2 puffs into the lungs every 6 hours as needed for Wheezing 1 each 0    Multiple Vitamins-Minerals (MULTIVITAMIN & MINERAL PO) Take 1 tablet by mouth daily       No current facility-administered medications on file prior to visit. Patient Active Problem List   Diagnosis Code    Gastritis K29.70    HLD (hyperlipidemia) E78.5    Pre-hypertension R03.0    Toe pain, left M79.675     _________________________________________________________  Past Medical History:   Diagnosis Date    Arthritis     Asthma     Diarrhea     Diverticulosis     GERD (gastroesophageal reflux disease)     Stomach pain, controlled with omeprazole    Hyperlipidemia     not on any medications    Ocular migraine     PONV (postoperative nausea and vomiting)        Family History   Problem Relation Age of Onset    Other Mother 29        passed of brain aneurysm    High Blood Pressure Father     Other Maternal Grandfather         COPD    Cancer Paternal Grandmother 28        colon    Heart Disease Paternal Grandfather 61       Past Surgical History:   Procedure Laterality Date    ADENOIDECTOMY  0     [facility]      Dr. Luana Willis, TOTAL ABDOMINAL (CERVIX REMOVED)      [place] COL[place] DX W/COLLJ SPEC WHEN PFRMD N/A 2018    COLONOSCOPY DIAGNOSTIC performed by Tremayne Morris MD at [address] [place] [address]  2015    L4 or L5 Microdiscectomy    UPPER GASTROINTESTINAL ENDOSCOPY      resulted with alkaline reflux, Dr. Bharathi Abdul History     Tobacco Use    Smoking status: Never     Passive exposure: Yes    Smokeless tobacco: Never   Vaping Use    Vaping Use: Never used   Substance Use Topics    Alcohol use:  Yes     Alcohol/week: 2.0 standard drinks     Types: 2 Glasses of wine per week     Comment: social    Drug

## 2023-04-25 ENCOUNTER — PATIENT MESSAGE (OUTPATIENT)
Dept: FAMILY MEDICINE CLINIC | Age: 48
End: 2023-04-25

## 2023-04-25 DIAGNOSIS — I10 ESSENTIAL HYPERTENSION: ICD-10-CM

## 2023-04-25 DIAGNOSIS — R05.9 COUGH, UNSPECIFIED TYPE: ICD-10-CM

## 2023-04-25 LAB
ALBUMIN SERPL-MCNC: 4.4 G/DL (ref 3.5–5.2)
ALP SERPL-CCNC: 70 U/L (ref 35–104)
ALT SERPL-CCNC: 21 U/L (ref 0–32)
ANION GAP SERPL CALCULATED.3IONS-SCNC: 9 MMOL/L (ref 7–16)
AST SERPL-CCNC: 21 U/L (ref 0–31)
BASOPHILS # BLD: 0.02 E9/L (ref 0–0.2)
BASOPHILS NFR BLD: 0.3 % (ref 0–2)
BILIRUB SERPL-MCNC: 0.3 MG/DL (ref 0–1.2)
BUN SERPL-MCNC: 9 MG/DL (ref 6–20)
CALCIUM SERPL-MCNC: 9.5 MG/DL (ref 8.6–10.2)
CHLORIDE SERPL-SCNC: 103 MMOL/L (ref 98–107)
CHOLESTEROL, TOTAL: 242 MG/DL (ref 0–199)
CO2 SERPL-SCNC: 26 MMOL/L (ref 22–29)
CREAT SERPL-MCNC: 0.8 MG/DL (ref 0.5–1)
D DIMER: <200 NG/ML DDU
EOSINOPHIL # BLD: 0.2 E9/L (ref 0.05–0.5)
EOSINOPHIL NFR BLD: 2.8 % (ref 0–6)
ERYTHROCYTE [DISTWIDTH] IN BLOOD BY AUTOMATED COUNT: 13.6 FL (ref 11.5–15)
GLUCOSE SERPL-MCNC: 93 MG/DL (ref 74–99)
HCT VFR BLD AUTO: 46.2 % (ref 34–48)
HDLC SERPL-MCNC: 48 MG/DL
HGB BLD-MCNC: 14.6 G/DL (ref 11.5–15.5)
IMM GRANULOCYTES # BLD: 0.02 E9/L
IMM GRANULOCYTES NFR BLD: 0.3 % (ref 0–5)
LDLC SERPL CALC-MCNC: 148 MG/DL (ref 0–99)
LYMPHOCYTES # BLD: 2.18 E9/L (ref 1.5–4)
LYMPHOCYTES NFR BLD: 30.5 % (ref 20–42)
MCH RBC QN AUTO: 29.6 PG (ref 26–35)
MCHC RBC AUTO-ENTMCNC: 31.6 % (ref 32–34.5)
MCV RBC AUTO: 93.7 FL (ref 80–99.9)
MONOCYTES # BLD: 0.41 E9/L (ref 0.1–0.95)
MONOCYTES NFR BLD: 5.7 % (ref 2–12)
NEUTROPHILS # BLD: 4.32 E9/L (ref 1.8–7.3)
NEUTS SEG NFR BLD: 60.4 % (ref 43–80)
PLATELET # BLD AUTO: 330 E9/L (ref 130–450)
PMV BLD AUTO: 10.3 FL (ref 7–12)
POTASSIUM SERPL-SCNC: 4.1 MMOL/L (ref 3.5–5)
PROT SERPL-MCNC: 7.4 G/DL (ref 6.4–8.3)
RBC # BLD AUTO: 4.93 E12/L (ref 3.5–5.5)
SODIUM SERPL-SCNC: 138 MMOL/L (ref 132–146)
TRIGL SERPL-MCNC: 232 MG/DL (ref 0–149)
VLDLC SERPL CALC-MCNC: 46 MG/DL
WBC # BLD: 7.2 E9/L (ref 4.5–11.5)

## 2023-04-25 NOTE — TELEPHONE ENCOUNTER
From: April ZAMZAM Sánchez  To: Dr. Hans Haque  Sent: 4/25/2023 8:39 AM EDT  Subject: Pain in back right rib cage    I am still having a lot of pain in my right back rib cage area the only way I can describe it is it comes and goes in kind of clamps down on me or maybe is having some sort of a spasm. It was really bad yesterday and last night if you could please put through the order for the blood test you wanted me to have a few weeks ago I did not get in there to get it done I will get in there today or tomorrow whenever the order is ready.  Thank you so much

## 2023-05-07 DIAGNOSIS — K21.9 GASTROESOPHAGEAL REFLUX DISEASE, UNSPECIFIED WHETHER ESOPHAGITIS PRESENT: ICD-10-CM

## 2023-05-08 RX ORDER — OMEPRAZOLE 40 MG/1
CAPSULE, DELAYED RELEASE ORAL
Qty: 90 CAPSULE | Refills: 1 | Status: SHIPPED | OUTPATIENT
Start: 2023-05-08

## 2023-05-10 ENCOUNTER — PATIENT MESSAGE (OUTPATIENT)
Dept: FAMILY MEDICINE CLINIC | Age: 48
End: 2023-05-10

## 2023-05-10 DIAGNOSIS — R05.3 PERSISTENT COUGH: ICD-10-CM

## 2023-05-10 DIAGNOSIS — H92.09 OTALGIA, UNSPECIFIED LATERALITY: Primary | ICD-10-CM

## 2023-05-10 NOTE — TELEPHONE ENCOUNTER
From: Yola Sánchez  To: Dr. Dove Skill: 5/10/2023 9:23 AM EDT  Subject: Referral to ENT    I am having pain again in my right ear it comes and goes kind of like a stabbing ice pick type of a pain and I have a lingering dry cough I think from drainage. If you could kindly put in a referral to the ear nose and throat doctor they are at [facility] ([facility] I would greatly appreciate it I would like to get an appointment with them as soon as possible.  Thank you

## 2023-05-11 ENCOUNTER — PROCEDURE VISIT (OUTPATIENT)
Dept: AUDIOLOGY | Age: 48
End: 2023-05-11
Payer: COMMERCIAL

## 2023-05-11 ENCOUNTER — TELEPHONE (OUTPATIENT)
Dept: ENT CLINIC | Age: 48
End: 2023-05-11

## 2023-05-11 ENCOUNTER — OFFICE VISIT (OUTPATIENT)
Dept: ENT CLINIC | Age: 48
End: 2023-05-11
Payer: COMMERCIAL

## 2023-05-11 VITALS
SYSTOLIC BLOOD PRESSURE: 143 MMHG | TEMPERATURE: 97.2 F | HEIGHT: 61 IN | DIASTOLIC BLOOD PRESSURE: 86 MMHG | BODY MASS INDEX: 35.87 KG/M2 | WEIGHT: 190 LBS | OXYGEN SATURATION: 98 % | HEART RATE: 76 BPM

## 2023-05-11 DIAGNOSIS — Z01.818 PREOP TESTING: ICD-10-CM

## 2023-05-11 DIAGNOSIS — H90.3 SENSORINEURAL HEARING LOSS (SNHL), BILATERAL: ICD-10-CM

## 2023-05-11 DIAGNOSIS — H90.3 SENSORINEURAL HEARING LOSS (SNHL) OF BOTH EARS: Primary | ICD-10-CM

## 2023-05-11 DIAGNOSIS — J30.9 CHRONIC ALLERGIC RHINITIS: ICD-10-CM

## 2023-05-11 DIAGNOSIS — H93.13 TINNITUS OF BOTH EARS: ICD-10-CM

## 2023-05-11 DIAGNOSIS — H93.A1 PULSATILE TINNITUS, RIGHT EAR: Primary | ICD-10-CM

## 2023-05-11 PROCEDURE — 92557 COMPREHENSIVE HEARING TEST: CPT | Performed by: AUDIOLOGIST

## 2023-05-11 PROCEDURE — 92567 TYMPANOMETRY: CPT | Performed by: AUDIOLOGIST

## 2023-05-11 PROCEDURE — 99204 OFFICE O/P NEW MOD 45 MIN: CPT

## 2023-05-11 RX ORDER — AZELASTINE 1 MG/ML
1 SPRAY, METERED NASAL 2 TIMES DAILY
Qty: 30 ML | Refills: 0 | Status: SHIPPED | OUTPATIENT
Start: 2023-05-11

## 2023-05-11 ASSESSMENT — ENCOUNTER SYMPTOMS
SINUS PRESSURE: 0
RHINORRHEA: 0
EYE DISCHARGE: 0
EYE PAIN: 0
COUGH: 0
SORE THROAT: 0
SHORTNESS OF BREATH: 0
VOMITING: 0
DIARRHEA: 0
BACK PAIN: 0

## 2023-05-11 NOTE — PROGRESS NOTES
Subjective:     Patient ID:  Lily Almazan is a 49 y.o. female. HPI:    Tinnitus  Patient presents with tinnitus. Onset of symptoms was gradual several years ago ago with gradually worsening course since that time. Patient describes the tinnitus as constant located in the bilateral ear. The quality is described as high pitch that sounds like buzzing with intermittent wooshing. The pattern is pulsatile with an intensity that is . Patient describes her level of annoyance as moderately annoying, always aware. Associated symptoms include pain and recurrent otitis Family history is negative family history for tinnitusPatient has had no prior evaluation, treatment or surgery for tinnitus  Previous treatments include none. Patient does not have hearing aids at this time. History of Head trauma: no   Description: none  History of surgery to the head/neck: yes   Description:adenoidectomy   History of cerumen impaction: no  History of noise exposure: no   Type: none  Spinning: no  Hearing loss: no   Aural pressure: yes  Tinnitus:yes  Otalgia:yes    States she has a constant high pitched buzz in bilateral ears. Gets an intermittent pulsatile woosh sound in the right ear. Reports constantly feeling congested. Uses Flonase one spray to each nostril daily and Zyrtec.    Past Medical History:   Diagnosis Date    Arthritis     Asthma     Diarrhea     Diverticulosis     GERD (gastroesophageal reflux disease)     Stomach pain, controlled with omeprazole    Hyperlipidemia     not on any medications    Ocular migraine     PONV (postoperative nausea and vomiting)      Past Surgical History:   Procedure Laterality Date    Dionne Reyes, TOTAL ABDOMINAL (CERVIX REMOVED)      [place] COLONOSCOPY FLX DX W/COLLJ SPEC WHEN PFRMD N/A 9/17/2018    COLONOSCOPY DIAGNOSTIC performed by Skip Lang MD at [address] [place] [address]

## 2023-05-11 NOTE — TELEPHONE ENCOUNTER
[facility] to authorize order with patient insurance. Patient is scheduled for CT IAC with radiology on 6/12/23 @ 3:30pm. Patient has been notified of date and time and that they need to arrive at 3:00pm. Patient was informed she needs to be NPO 3 hours prior to procedure. Patient instructed to park in [facility] parking lot and report to registration. Patient verbalized understanding.     Electronically signed by Lyndsay Loya on 5/11/23 at 11:47 AM EDT

## 2023-05-11 NOTE — PROGRESS NOTES
This patient was referred for audiometric and tympanometric testing by QASIM Shin due to otalgia and bilateral  tinnitus, with occasional right ear pulsatile tinnitus. Audiometry using pure tone air and bone conduction testing revealed a borderline normal  sensorineural hearing loss in the low frequencies, rising to normal hearing for the higher frequencies, bilaterally. Reliability was good. Speech reception thresholds were in good agreement with the pure tone averages, bilaterally. Speech discrimination scores were excellent, bilaterally. Tympanometry revealed normal middle ear peak pressure and compliance, bilaterally. The results were reviewed with the patient. Recommendations for follow up will be made pending physician consult.     Hernán Fisher M.A., [address] [facility] [id number]  Electronically signed by Judy Osorio on 5/11/2023 at 9:33 AM

## 2023-05-17 ENCOUNTER — PATIENT MESSAGE (OUTPATIENT)
Dept: FAMILY MEDICINE CLINIC | Age: 48
End: 2023-05-17

## 2023-05-17 RX ORDER — PAROXETINE 10 MG/1
10 TABLET, FILM COATED ORAL EVERY MORNING
Qty: 60 TABLET | Refills: 0 | Status: SHIPPED | OUTPATIENT
Start: 2023-05-17

## 2023-05-17 NOTE — TELEPHONE ENCOUNTER
From: Yola Sánchez  To: Dr. Rust Alert: 5/17/2023 10:27 AM EDT  Subject: Anxiety or Depression Medication     I am finding myself crying all the time, unable to focus, just really down. I believe I need to go back on the anxiety/depression medication for a while. Please let me know if I need to make an appointment or if he can refill my prescription for it.  Thank you very much

## 2023-06-08 RX ORDER — PAROXETINE 10 MG/1
10 TABLET, FILM COATED ORAL EVERY MORNING
Qty: 90 TABLET | Refills: 1 | Status: SHIPPED | OUTPATIENT
Start: 2023-06-08

## 2023-06-08 NOTE — TELEPHONE ENCOUNTER
Last Appointment:  4/7/2023  Future Appointments   Date Time Provider Eugene Tatei   6/26/2023  8:00 AM BERTA Wright - CNP [place] [facility]   7/3/2023  3:30 PM [facility] 1[facility]BD [facility]

## 2023-07-05 ENCOUNTER — TELEPHONE (OUTPATIENT)
Dept: ENT CLINIC | Age: 48
End: 2023-07-05

## 2023-07-05 NOTE — TELEPHONE ENCOUNTER
Contacted patient regarding cancelled imaging, discussed the need for imaging prior to currently scheduled appointment (7/11/23) and provided patient with information on rescheduling. Patient advised to contact office after rescheduling her imaging.   Electronically signed by Angie Nova LPN on 3/9/2069 at 8:06 AM
Learning behavioral activities to cope with urges.  For example, distraction and changing routines

## 2023-08-09 NOTE — TELEPHONE ENCOUNTER
Last Appointment:  4/7/2023  No future appointments. Message left for patient requesting a return call. Need to confirm dosing and that refill is needed. Would she like to schedule a future appt for follow up?

## 2023-08-11 RX ORDER — LOSARTAN POTASSIUM 25 MG/1
TABLET ORAL
Qty: 90 TABLET | Refills: 1 | Status: SHIPPED | OUTPATIENT
Start: 2023-08-11

## 2023-08-29 ENCOUNTER — OFFICE VISIT (OUTPATIENT)
Dept: FAMILY MEDICINE CLINIC | Age: 48
End: 2023-08-29
Payer: COMMERCIAL

## 2023-08-29 VITALS
WEIGHT: 194 LBS | SYSTOLIC BLOOD PRESSURE: 136 MMHG | TEMPERATURE: 98.2 F | RESPIRATION RATE: 16 BRPM | HEIGHT: 61 IN | OXYGEN SATURATION: 97 % | DIASTOLIC BLOOD PRESSURE: 80 MMHG | HEART RATE: 97 BPM | BODY MASS INDEX: 36.63 KG/M2

## 2023-08-29 DIAGNOSIS — R20.0 THIGH NUMBNESS: ICD-10-CM

## 2023-08-29 DIAGNOSIS — I10 ESSENTIAL HYPERTENSION: ICD-10-CM

## 2023-08-29 DIAGNOSIS — K21.9 GASTROESOPHAGEAL REFLUX DISEASE, UNSPECIFIED WHETHER ESOPHAGITIS PRESENT: Primary | ICD-10-CM

## 2023-08-29 PROCEDURE — 3078F DIAST BP <80 MM HG: CPT | Performed by: FAMILY MEDICINE

## 2023-08-29 PROCEDURE — 3074F SYST BP LT 130 MM HG: CPT | Performed by: FAMILY MEDICINE

## 2023-08-29 PROCEDURE — 99214 OFFICE O/P EST MOD 30 MIN: CPT | Performed by: FAMILY MEDICINE

## 2023-08-29 RX ORDER — LORATADINE 10 MG/1
10 CAPSULE, LIQUID FILLED ORAL DAILY
COMMUNITY

## 2023-08-29 RX ORDER — LOSARTAN POTASSIUM 50 MG/1
25 TABLET ORAL DAILY
Qty: 30 TABLET | Refills: 0 | Status: SHIPPED
Start: 2023-08-29 | End: 2023-08-29

## 2023-08-29 RX ORDER — LOSARTAN POTASSIUM 50 MG/1
50 TABLET ORAL DAILY
Qty: 30 TABLET | Refills: 0 | Status: SHIPPED | OUTPATIENT
Start: 2023-08-29

## 2023-08-29 SDOH — ECONOMIC STABILITY: HOUSING INSECURITY
IN THE LAST 12 MONTHS, WAS THERE A TIME WHEN YOU DID NOT HAVE A STEADY PLACE TO SLEEP OR SLEPT IN A SHELTER (INCLUDING NOW)?: NO

## 2023-08-29 SDOH — ECONOMIC STABILITY: INCOME INSECURITY: HOW HARD IS IT FOR YOU TO PAY FOR THE VERY BASICS LIKE FOOD, HOUSING, MEDICAL CARE, AND HEATING?: NOT HARD AT ALL

## 2023-08-29 SDOH — ECONOMIC STABILITY: FOOD INSECURITY: WITHIN THE PAST 12 MONTHS, YOU WORRIED THAT YOUR FOOD WOULD RUN OUT BEFORE YOU GOT MONEY TO BUY MORE.: NEVER TRUE

## 2023-08-29 SDOH — ECONOMIC STABILITY: FOOD INSECURITY: WITHIN THE PAST 12 MONTHS, THE FOOD YOU BOUGHT JUST DIDN'T LAST AND YOU DIDN'T HAVE MONEY TO GET MORE.: NEVER TRUE

## 2023-09-25 ENCOUNTER — TELEPHONE (OUTPATIENT)
Dept: FAMILY MEDICINE CLINIC | Age: 48
End: 2023-09-25

## 2023-09-25 DIAGNOSIS — R19.7 DIARRHEA, UNSPECIFIED TYPE: Primary | ICD-10-CM

## 2023-09-25 NOTE — TELEPHONE ENCOUNTER
I think a colonoscopy might be indicated. Its been about 5 years since her last one. Since she has been having persistent diarrhea without relief it would be reasonable to talk about this again. What does she think?

## 2023-09-25 NOTE — TELEPHONE ENCOUNTER
April has had diarrhea for at least 2 months. It is yellow and very watery. Occasionally she will have a normal stool, but not often. She gets cramps and breaks out in a sweat most of time. She has tried Metamucil, but not Imodium because it has never worked for her in the past.     What is your recommendation?

## 2023-09-26 NOTE — TELEPHONE ENCOUNTER
Please contact [facility] and see if they have a colonoscopy in the last 5 years. Most recent I see with done in [facility] system about 5 years ago, so I dont want to make an incorrect assessment if shes actually also had one at [facility] more recently.

## 2023-09-26 NOTE — TELEPHONE ENCOUNTER
I called [facility] to request past colonoscopy report on this patient, but was transferred to the records dept where I had to leave a message to call back.

## 2023-09-26 NOTE — TELEPHONE ENCOUNTER
Pt informed&voiced understanding. She is agreeable to a colonoscopy. She believes her last one was at [facility] and she would like to go back there.  I did not see record of this in chart to see what

## 2023-10-04 NOTE — TELEPHONE ENCOUNTER
Called and spoke w/ Jimbo Lang in medical records at [facility]. She has not had a colonoscopy there. She was in in patient 5-6 years ago w/ Dr Jorge Minaya and an EGD in 2013 w/ Dr Marta German. Updated April, states she has only had one colonoscopy but was mistaken it was not at [facility]. She would like to have her next colonoscopy at [facility].

## 2023-10-06 ENCOUNTER — OFFICE VISIT (OUTPATIENT)
Dept: FAMILY MEDICINE CLINIC | Age: 48
End: 2023-10-06
Payer: COMMERCIAL

## 2023-10-06 ENCOUNTER — CLINICAL DOCUMENTATION (OUTPATIENT)
Dept: FAMILY MEDICINE CLINIC | Age: 48
End: 2023-10-06

## 2023-10-06 VITALS
OXYGEN SATURATION: 98 % | WEIGHT: 198 LBS | BODY MASS INDEX: 37.38 KG/M2 | TEMPERATURE: 97.8 F | RESPIRATION RATE: 18 BRPM | HEIGHT: 61 IN | SYSTOLIC BLOOD PRESSURE: 132 MMHG | HEART RATE: 95 BPM | DIASTOLIC BLOOD PRESSURE: 78 MMHG

## 2023-10-06 DIAGNOSIS — N30.01 ACUTE CYSTITIS WITH HEMATURIA: ICD-10-CM

## 2023-10-06 DIAGNOSIS — N30.01 ACUTE CYSTITIS WITH HEMATURIA: Primary | ICD-10-CM

## 2023-10-06 LAB
BILIRUBIN, POC: NEGATIVE
BLOOD URINE, POC: NORMAL
CLARITY, POC: NORMAL
COLOR, POC: YELLOW
GLUCOSE URINE, POC: NEGATIVE
KETONES, POC: NEGATIVE
LEUKOCYTE EST, POC: NORMAL
NITRITE, POC: NEGATIVE
PH, POC: 6
PROTEIN, POC: 30
SPECIFIC GRAVITY, POC: 1.02
UROBILINOGEN, POC: 0.2

## 2023-10-06 PROCEDURE — 81002 URINALYSIS NONAUTO W/O SCOPE: CPT | Performed by: PHYSICIAN ASSISTANT

## 2023-10-06 PROCEDURE — 99213 OFFICE O/P EST LOW 20 MIN: CPT | Performed by: PHYSICIAN ASSISTANT

## 2023-10-06 RX ORDER — SULFAMETHOXAZOLE AND TRIMETHOPRIM 800; 160 MG/1; MG/1
1 TABLET ORAL 2 TIMES DAILY
Qty: 14 TABLET | Refills: 0 | Status: SHIPPED | OUTPATIENT
Start: 2023-10-06 | End: 2023-10-13

## 2023-10-06 NOTE — PROGRESS NOTES
Chief Complaint       Urinary Tract Infection (Onset x 3 days /Urgency/ dysuria/ strong odor )      History of Present Illness   Source of history provided by:  patientGeo Higginbotham is a 51 y.o. old female presenting to the walk in clinic for evaluation of dysuria x 3 days. Reports associated frequency, urgency, and suprapubic pressure. Denies gross hematuria. Denies associated flank pain. Denies any fever, chills, vaginal discharge, vaginal bleeding, possibility of pregnancy, vomiting, diarrhea, or lethargy. Patient's last menstrual period was 2017. ROS    Unless otherwise stated in this report or unable to obtain because of the patient's clinical or mental status as evidenced by the medical record, this patients's positive and negative responses for Review of Systems, constitutional, psych, eyes, ENT, cardiovascular, respiratory, gastrointestinal, neurological, genitourinary, musculoskeletal, integument systems and systems related to the presenting problem are either stated in the preceding or were not pertinent or were negative for the symptoms and/or complaints related to the medical problem. Past Medical History:  has a past medical history of Arthritis, Asthma, Diarrhea, Diverticulosis, GERD (gastroesophageal reflux disease), Hyperlipidemia, Ocular migraine, and PONV (postoperative nausea and vomiting). Past Surgical History:  has a past surgical history that includes  section; Spine surgery (2015); Upper gastrointestinal endoscopy (); Hysterectomy, total abdominal; Adenoidectomy (); Cholecystectomy, laparoscopic; and pr colonoscopy flx dx w/collj spec when pfrmd (N/A, 2018). Social History:  reports that she has never smoked. She has been exposed to tobacco smoke. She has never used smokeless tobacco. She reports current alcohol use of about 2.0 standard drinks of alcohol per week. She reports that she does not use drugs.   Family History: family history

## 2023-10-06 NOTE — PROGRESS NOTES
[address]  Brief On call note     Patient called with concerns of an allergic reaction. She was in the walk in clinic today and was given bactrim for a UTI. Patient states she took the medication at 4pm today and now (5:30) she has started getting hives, her lips are swelling. She is still able to breath and no closing of the airways. Patient is advised to stop taking that medication, take a benadryl and head to the ER for further evaluation with concerns of her lips swelling and possible airway closure. She will be monitored by her vitals, and may need steroids/epi and a different abx treatment for her UTI. Patient agrees with the plan and states she has someone who can drive her there.      Chari Kitchen MD  Family Medicine, PGY-3

## 2023-10-09 LAB
CULTURE: ABNORMAL
CULTURE: ABNORMAL
SPECIMEN DESCRIPTION: ABNORMAL

## 2023-10-30 DIAGNOSIS — I10 ESSENTIAL HYPERTENSION: ICD-10-CM

## 2023-10-30 NOTE — TELEPHONE ENCOUNTER
Last Appointment:  8/29/2023  Future Appointments   Date Time Provider [address]   3/4/2024  8:40 AM Leilani Hussein  [address]

## 2023-10-31 RX ORDER — LOSARTAN POTASSIUM 50 MG/1
50 TABLET ORAL DAILY
Qty: 90 TABLET | Refills: 1 | Status: SHIPPED | OUTPATIENT
Start: 2023-10-31

## 2023-12-04 ENCOUNTER — OFFICE VISIT (OUTPATIENT)
Dept: FAMILY MEDICINE CLINIC | Age: 48
End: 2023-12-04
Payer: COMMERCIAL

## 2023-12-04 VITALS
TEMPERATURE: 98.7 F | DIASTOLIC BLOOD PRESSURE: 74 MMHG | SYSTOLIC BLOOD PRESSURE: 134 MMHG | OXYGEN SATURATION: 98 % | BODY MASS INDEX: 37.19 KG/M2 | WEIGHT: 197 LBS | HEART RATE: 106 BPM | HEIGHT: 61 IN

## 2023-12-04 DIAGNOSIS — R05.3 PERSISTENT COUGH: Primary | ICD-10-CM

## 2023-12-04 PROCEDURE — 99213 OFFICE O/P EST LOW 20 MIN: CPT | Performed by: FAMILY MEDICINE

## 2023-12-04 RX ORDER — ALBUTEROL SULFATE 90 UG/1
2 AEROSOL, METERED RESPIRATORY (INHALATION) 4 TIMES DAILY PRN
Qty: 18 G | Refills: 0 | Status: SHIPPED | OUTPATIENT
Start: 2023-12-04

## 2023-12-04 RX ORDER — OMEPRAZOLE 40 MG/1
40 CAPSULE, DELAYED RELEASE ORAL DAILY
Qty: 90 CAPSULE | Refills: 1 | Status: SHIPPED | OUTPATIENT
Start: 2023-12-04

## 2023-12-04 RX ORDER — PAROXETINE 10 MG/1
10 TABLET, FILM COATED ORAL EVERY MORNING
Qty: 90 TABLET | Refills: 1 | Status: SHIPPED | OUTPATIENT
Start: 2023-12-04

## 2023-12-04 RX ORDER — LEVOCETIRIZINE DIHYDROCHLORIDE 5 MG/1
5 TABLET, FILM COATED ORAL NIGHTLY
COMMUNITY

## 2023-12-04 NOTE — PROGRESS NOTES
had an atopic tendency when she was younger and was told she had asthma, but never really had to use an inhaler. Will start off with albuterol over the next couple of days and if this is helping significantly we will stick with it. If she does not note improvement we will consider oral ICS/LABA versus oral steroid. Refills  -     omeprazole (PRILOSEC) 40 MG delayed release capsule; Take 1 capsule by mouth daily  -     PARoxetine (PAXIL) 10 MG tablet; Take 1 tablet by mouth every morning       or as scheduled. Patient counseled to follow up sooner or seek more acute care if symptoms worsening or not improving according to plan. Electronically signed by Semaj Cohn MD on 12/4/2023    _________________________________________________________  Current Outpatient Medications on File Prior to Visit   Medication Sig Dispense Refill    levocetirizine (XYZAL) 5 MG tablet Take 1 tablet by mouth nightly      losartan (COZAAR) 50 MG tablet Take 1 tablet by mouth daily 90 tablet 1    azelastine (ASTELIN) 0.1 % nasal spray 1 spray by Nasal route 2 times daily Use in each nostril as directed (Patient taking differently: 1 spray by Nasal route as needed Use in each nostril as directed) 30 mL 0    fluticasone (FLONASE) 50 MCG/ACT nasal spray 2 sprays by Nasal route daily (Patient taking differently: 2 sprays by Nasal route as needed) 1 each 2    Multiple Vitamins-Minerals (MULTIVITAMIN & MINERAL PO) Take 1 tablet by mouth daily      albuterol sulfate HFA (PROAIR HFA) 108 (90 Base) MCG/ACT inhaler Inhale 2 puffs into the lungs every 6 hours as needed for Wheezing (Patient not taking: Reported on 8/29/2023) 1 each 0     No current facility-administered medications on file prior to visit.        Patient Active Problem List   Diagnosis Code    Gastritis K29.70    HLD (hyperlipidemia) E78.5    Pre-hypertension R03.0    Toe pain, left M79.675     _________________________________________________________  Past Medical

## 2023-12-07 ENCOUNTER — TELEPHONE (OUTPATIENT)
Dept: ENT CLINIC | Age: 48
End: 2023-12-07

## 2023-12-07 DIAGNOSIS — H93.A1 PULSATILE TINNITUS, RIGHT EAR: Primary | ICD-10-CM

## 2023-12-07 NOTE — TELEPHONE ENCOUNTER
Katie Valentine from [facility] Rad[facility] called wanting a BUN and CREAT sent over for patient.  Electronically signed by Mercedez Dunbar on 12/7/2023 at 11:15 AM

## 2023-12-18 DIAGNOSIS — H93.A1 PULSATILE TINNITUS, RIGHT EAR: ICD-10-CM

## 2023-12-18 LAB
BUN BLDV-MCNC: 11 MG/DL (ref 6–20)
CREAT SERPL-MCNC: 0.7 MG/DL (ref 0.5–1)
GFR SERPL CREATININE-BSD FRML MDRD: >60 ML/MIN/1.73M2

## 2023-12-22 ENCOUNTER — TELEPHONE (OUTPATIENT)
Dept: FAMILY MEDICINE CLINIC | Age: 48
End: 2023-12-22

## 2023-12-22 NOTE — TELEPHONE ENCOUNTER
Rell examination form completed, scanned into chart. Tried calling pt to advise that form was completed and ready for , but pt's phone number that is listed is not in service. ([phone])    Called her  Lyly Giang and left message stating that form is ready for  at .   [phone]

## 2024-01-24 ENCOUNTER — TELEPHONE (OUTPATIENT)
Dept: ADMINISTRATIVE | Age: 49
End: 2024-01-24

## 2024-01-24 NOTE — TELEPHONE ENCOUNTER
Patient will need an appointment for her test results, she will need a follow up appointment, I will reach out to schedule  Electronically signed by Sandy Davis LPN on 1/24/2024 at 12:12 PM

## 2024-01-26 ENCOUNTER — TELEPHONE (OUTPATIENT)
Dept: FAMILY MEDICINE CLINIC | Age: 49
End: 2024-01-26

## 2024-01-26 NOTE — TELEPHONE ENCOUNTER
Recommend coming out to express care today before 5 or tomorrow morning.   Dont know what Im treating without evaluating her.

## 2024-01-26 NOTE — TELEPHONE ENCOUNTER
I gave her an albuterol inhaler near  2 months ago when I last saw her for what sounded like postviral cough syndrome after COVID diagnosis.  Did she get better and now she is having a new problem or did she never get better over the past 2 months?  Did the albuterol inhaler change or help her symptoms?  Any new symptoms?  Does she feel like she is getting sick?  Any blood in the cough/sputum?

## 2024-01-26 NOTE — TELEPHONE ENCOUNTER
Pt called in and said that her cough had let up for a while and has now returned along with some vomiting,wheezing and also a pain in her shoulder blade. She does see ENT on 2/16 but she was asking what you thought she should do, or if you would want to see her.

## 2024-01-26 NOTE — TELEPHONE ENCOUNTER
I asked April your questions and recorded her responses.        The Albuterol inhaler helped.     This is a new cough.  It is deeper and more productive.      She is coughing so hard at times that she has vomited.       She gets a pain in her Right shoulder blade when she coughs as well.     She said the inhaler is not helping this cough.

## 2024-01-29 ENCOUNTER — TELEPHONE (OUTPATIENT)
Dept: FAMILY MEDICINE CLINIC | Age: 49
End: 2024-01-29

## 2024-01-29 ENCOUNTER — OFFICE VISIT (OUTPATIENT)
Dept: FAMILY MEDICINE CLINIC | Age: 49
End: 2024-01-29
Payer: COMMERCIAL

## 2024-01-29 VITALS
HEIGHT: 61 IN | HEART RATE: 83 BPM | WEIGHT: 213 LBS | SYSTOLIC BLOOD PRESSURE: 132 MMHG | TEMPERATURE: 98.5 F | BODY MASS INDEX: 40.22 KG/M2 | OXYGEN SATURATION: 98 % | RESPIRATION RATE: 18 BRPM | DIASTOLIC BLOOD PRESSURE: 72 MMHG

## 2024-01-29 DIAGNOSIS — R11.10 POST-TUSSIVE EMESIS: ICD-10-CM

## 2024-01-29 DIAGNOSIS — J45.909 REACTIVE AIRWAY DISEASE WITHOUT COMPLICATION, UNSPECIFIED ASTHMA SEVERITY, UNSPECIFIED WHETHER PERSISTENT: ICD-10-CM

## 2024-01-29 DIAGNOSIS — R05.2 SUBACUTE COUGH: Primary | ICD-10-CM

## 2024-01-29 PROCEDURE — 99214 OFFICE O/P EST MOD 30 MIN: CPT | Performed by: FAMILY MEDICINE

## 2024-01-29 RX ORDER — AZITHROMYCIN 250 MG/1
250 TABLET, FILM COATED ORAL SEE ADMIN INSTRUCTIONS
Qty: 6 TABLET | Refills: 0 | Status: SHIPPED | OUTPATIENT
Start: 2024-01-29 | End: 2024-02-03

## 2024-01-29 RX ORDER — METHYLPREDNISOLONE 4 MG/1
TABLET ORAL
Qty: 1 KIT | Refills: 0 | Status: SHIPPED | OUTPATIENT
Start: 2024-01-29 | End: 2024-02-04

## 2024-01-29 ASSESSMENT — PATIENT HEALTH QUESTIONNAIRE - PHQ9
SUM OF ALL RESPONSES TO PHQ QUESTIONS 1-9: 0
1. LITTLE INTEREST OR PLEASURE IN DOING THINGS: 0
SUM OF ALL RESPONSES TO PHQ QUESTIONS 1-9: 0
SUM OF ALL RESPONSES TO PHQ9 QUESTIONS 1 & 2: 0
SUM OF ALL RESPONSES TO PHQ QUESTIONS 1-9: 0
SUM OF ALL RESPONSES TO PHQ QUESTIONS 1-9: 0
2. FEELING DOWN, DEPRESSED OR HOPELESS: 0

## 2024-01-29 NOTE — PROGRESS NOTES
[facility]    April ZAMZAM Princess presents to the office today for   Chief Complaint   Patient presents with    Cough     1 month      Cough  X 1 month  She had COVID in early November and coughed until mid Dec  She feels it was gone at the end of December but started again about 1 month ago  Will vomit phlegm due to coughing  Albuterol inhaler was helpful she received from PCP in December  This new cough is wet, deep and productive  No fevers  Wheezing  She reports hx of asthma  Albuterol now not helping    Review of Systems     /72   Pulse 83   Temp 98.5 °F (36.9 °C) (Temporal)   Resp 18   Ht 1.549 m (5' 1\")   Wt 96.6 kg (213 lb)   LMP 01/03/2017   SpO2 98%   BMI 40.25 kg/m²   Physical Exam  Constitutional:       Appearance: Normal appearance.   HENT:      Head: Normocephalic and atraumatic.      Nose: Congestion present.      Mouth/Throat:      Pharynx: Posterior oropharyngeal erythema present. No oropharyngeal exudate.   Eyes:      Extraocular Movements: Extraocular movements intact.      Conjunctiva/sclera: Conjunctivae normal.   Cardiovascular:      Rate and Rhythm: Normal rate.      Heart sounds: Normal heart sounds.   Pulmonary:      Effort: Pulmonary effort is normal.      Breath sounds: Normal breath sounds.   Skin:     General: Skin is warm.   Neurological:      Mental Status: She is alert and oriented to person, place, and time.   Psychiatric:         Mood and Affect: Mood normal.         Behavior: Behavior normal.            Current Outpatient Medications:     azithromycin (ZITHROMAX) 250 MG tablet, Take 1 tablet by mouth See Admin Instructions for 5 days 500mg on day 1 followed by 250mg on days 2 - 5, Disp: 6 tablet, Rfl: 0    methylPREDNISolone (MEDROL DOSEPACK) 4 MG tablet, Take by mouth., Disp: 1 kit, Rfl: 0    levocetirizine (XYZAL) 5 MG tablet, Take 1 tablet by mouth nightly, Disp: , Rfl:     omeprazole (PRILOSEC) 40 MG delayed release capsule, Take 1 capsule by mouth daily,

## 2024-01-29 NOTE — TELEPHONE ENCOUNTER
Patient came up to my check out window. She was seen today through express care. She was put on antibiotics and was told to follow up with you in a couple weeks. I did not see anything available. Please advise.

## 2024-02-02 ENCOUNTER — TELEPHONE (OUTPATIENT)
Dept: FAMILY MEDICINE CLINIC | Age: 49
End: 2024-02-02

## 2024-02-02 DIAGNOSIS — R05.3 CHRONIC COUGH: Primary | ICD-10-CM

## 2024-02-02 DIAGNOSIS — R04.2 BLOOD-TINGED SPUTUM: ICD-10-CM

## 2024-02-02 NOTE — TELEPHONE ENCOUNTER
Lets start with a chest x-ray.  She can get that done on Monday (or today before 5).   Alternatively [facility] this weekend.  If any increase in blood or increase in shortness of breath or development of new chest pain, go to ER

## 2024-02-02 NOTE — TELEPHONE ENCOUNTER
Patient calling in to report that she was seen in [facility] on 1/29.  Patient was advised to call in to the office with any worsening or changes in symptoms.  Patient reports that she was coughing and expectorated a small amount of blood tinged mucous.  Patient reports that when she blows her nose the nasal drainage also is blood tinged at times.  Patient stated she does have an appointment with you scheduled for 2/9.  Please advise.

## 2024-02-05 NOTE — TELEPHONE ENCOUNTER
Left a message on the voicemail to call back to the nurse line to discuss getting a chest Xray.

## 2024-02-06 RX ORDER — FLUTICASONE PROPIONATE 50 MCG
2 SPRAY, SUSPENSION (ML) NASAL DAILY
Qty: 1 EACH | Refills: 5 | Status: SHIPPED | OUTPATIENT
Start: 2024-02-06

## 2024-02-06 NOTE — TELEPHONE ENCOUNTER
Last Appointment:  12/4/2023  Future Appointments   Date Time Provider Department Center   2/9/2024  1:00 PM Bird Hussein MD [facility]   2/16/2024  9:15 AM Gurjit Agrawal APRN - [facility] ENT [facility]   3/4/2024  8:40 AM Bird Hussein MD [facility]

## 2024-02-09 ENCOUNTER — OFFICE VISIT (OUTPATIENT)
Dept: FAMILY MEDICINE CLINIC | Age: 49
End: 2024-02-09
Payer: COMMERCIAL

## 2024-02-09 VITALS
SYSTOLIC BLOOD PRESSURE: 118 MMHG | HEART RATE: 79 BPM | OXYGEN SATURATION: 98 % | HEIGHT: 61 IN | WEIGHT: 201 LBS | BODY MASS INDEX: 37.95 KG/M2 | DIASTOLIC BLOOD PRESSURE: 86 MMHG | TEMPERATURE: 98.7 F

## 2024-02-09 DIAGNOSIS — R05.3 CHRONIC COUGH: Primary | ICD-10-CM

## 2024-02-09 DIAGNOSIS — J45.909 REACTIVE AIRWAY DISEASE WITHOUT COMPLICATION, UNSPECIFIED ASTHMA SEVERITY, UNSPECIFIED WHETHER PERSISTENT: ICD-10-CM

## 2024-02-09 PROCEDURE — 99213 OFFICE O/P EST LOW 20 MIN: CPT | Performed by: FAMILY MEDICINE

## 2024-02-09 NOTE — PROGRESS NOTES
[facility]  Office Progress Note - Dr. Hussein  2/9/24    CC:   Chief Complaint   Patient presents with    Cough     [facility] care follow up 01/29/24  Has improved some since express care. Does still have some coughing spells.    Pain in shoulders has improved as well. Still present some.        /86   Pulse 79   Temp 98.7 °F (37.1 °C) (Temporal)   Ht 1.549 m (5' 1\")   Wt 91.2 kg (201 lb)   LMP 01/03/2017   SpO2 98%   BMI 37.98 kg/m²   Wt Readings from Last 3 Encounters:   02/09/24 91.2 kg (201 lb)   01/29/24 96.6 kg (213 lb)   12/04/23 89.4 kg (197 lb)       HPI: chronic coughing.   Multiple back to back infections or something else going on.   She had some extensive coughing with blood tinged sputum  CXR was ok.   Finished z-shelby.   Coughing fits just as bad, but just not as frequent.   She'd also been having right shoulder blade pain - actually BL, but it improving over time.      Years ago she would get bronchitis recurrently.     She is seeing Ent next Friday. For tinnitus.       _________________________________________________________    Assessment / Plan  April was seen today for cough.    Diagnoses and all orders for this visit:    Chronic cough    Reactive airway disease without complication, unspecified asthma severity, unspecified whether persistent    I think likely has RAD with recent infections.   She's feeling better now  Unlikely PEs.   Recurrent story over time.   Would treat with combo LABA/ICS with next resp infection to see if this shorten and improves her symptoms.   PFTs discussed. Defer at this time given likely won't change the plan.   If ever developed CP, SOB, different experience, would CTA chest.     Can move appt out to later in the year for wellness visit.  or as scheduled.   Patient counseled to follow up sooner or seek more acute care if symptoms worsening or not improving according to plan.     Electronically signed by KWAKU HUSSEIN MD on

## 2024-02-16 ENCOUNTER — OFFICE VISIT (OUTPATIENT)
Dept: ENT CLINIC | Age: 49
End: 2024-02-16
Payer: COMMERCIAL

## 2024-02-16 VITALS — BODY MASS INDEX: 37.95 KG/M2 | HEIGHT: 61 IN | WEIGHT: 201 LBS

## 2024-02-16 DIAGNOSIS — J30.9 CHRONIC ALLERGIC RHINITIS: Primary | ICD-10-CM

## 2024-02-16 DIAGNOSIS — R87.615 ENCOUNTER FOR REPEAT PAPANICOLAOU SMEAR OF CERVIX DUE TO PREVIOUS UNSATISFACTORY RESULTS: ICD-10-CM

## 2024-02-16 DIAGNOSIS — H93.A1 PULSATILE TINNITUS, RIGHT EAR: ICD-10-CM

## 2024-02-16 PROCEDURE — 99214 OFFICE O/P EST MOD 30 MIN: CPT

## 2024-02-16 RX ORDER — AZELASTINE 1 MG/ML
2 SPRAY, METERED NASAL 2 TIMES DAILY
Qty: 30 ML | Refills: 2 | Status: SHIPPED | OUTPATIENT
Start: 2024-02-16

## 2024-02-16 NOTE — PROGRESS NOTES
was utilized to reduce the  radiation dose to as low as reasonably achievable.     COMPARISON:  Head CT dated 03/01/2013     HISTORY:  ORDERING SYSTEM PROVIDED HISTORY: Pulsatile tinnitus, right ear  TECHNOLOGIST PROVIDED HISTORY:  STAT Creatinine as needed:->Yes  Reason for exam:->Pulsatile tinnitus right ear     FINDINGS:  RIGHT TEMPORAL BONE:  The external auditory canal is clear without evidence  of bony erosion.  The scutum is intact.  The middle ear cavity is clear.  The  ossicular chain is intact.  The mastoid air cells are clear.  The inner ear  structures appear unremarkable.  Normal mineralization of the otic capsule.  The internal auditory canal and vestibular aqueduct appear unremarkable.  The  carotid canal is normal in appearance.  The jugular bulb is unremarkable.     LEFT TEMPORAL BONE: The external auditory canal is clear without evidence of  bony erosion.  The scutum is intact.  The middle ear cavity is clear.  The  ossicular chain is intact.  The mastoid air cells are clear.  The inner ear  structures appear unremarkable.  Normal mineralization of the otic capsule.  The internal auditory canal and vestibular aqueduct appear unremarkable.  The  carotid canal is normal in appearance.  The jugular bulb is unremarkable.     The left mandibular condyle appears somewhat truncated.  This may be a  postsurgical deformity or related to chronic degenerative change.     BRAIN: The visualized portion of the intracranial contents appear  unremarkable.     ORBITS: The visualized portion of the orbits demonstrate no acute abnormality.     SINUSES: The visualized paranasal sinuses are clear.     IMPRESSION:  1. Unremarkable appearance of the bilateral temporal bones.  2. Truncated appearance of the left mandibular condyle.  This may be a  postsurgical deformity or related to chronic degenerative change.          Assessment:       Diagnosis Orders   1. Chronic allergic rhinitis        2. Pulsatile tinnitus, right

## 2024-03-13 ENCOUNTER — PATIENT MESSAGE (OUTPATIENT)
Dept: FAMILY MEDICINE CLINIC | Age: 49
End: 2024-03-13

## 2024-03-13 DIAGNOSIS — E66.01 CLASS 2 SEVERE OBESITY DUE TO EXCESS CALORIES WITH SERIOUS COMORBIDITY AND BODY MASS INDEX (BMI) OF 37.0 TO 37.9 IN ADULT (HCC): Primary | ICD-10-CM

## 2024-03-13 NOTE — TELEPHONE ENCOUNTER
From: Yola Sánchez  To: Dr. Bird Hussein  Sent: 3/13/2024 11:25 AM EDT  Subject: Wegovy Prescription     Hello,  I would like to try the prescription Wegovy to assist me with weight loss. Can you please tell me what I need to do. Thanks so much

## 2024-03-19 NOTE — TELEPHONE ENCOUNTER
Pt asking you to reconsider.    When completing these PA's, BMI, BP and Cholesterol are never asked about.  Only diabetes. So I believe diabetes is the FIRST criteria that needs met for consideration/approval.

## 2024-03-21 NOTE — TELEPHONE ENCOUNTER
Radha you could explain that to patient since you see the Auth criteria (I never see this so dont have any knowledge of the process).   Alternatively or in addition, you could have patient talk with her insurance company and specifically ask what the criteria are that they follow which will approve the authorization.  If they tell her just have your doctor fill it out and we'll approve it, they are not being truthful.

## 2024-03-22 NOTE — TELEPHONE ENCOUNTER
Spoke with pt and explained the below messages.  She will check with her insurance to get specific criteria that they require and keep in touch with us.

## 2024-05-17 ENCOUNTER — OFFICE VISIT (OUTPATIENT)
Dept: ENT CLINIC | Age: 49
End: 2024-05-17
Payer: COMMERCIAL

## 2024-05-17 ENCOUNTER — PROCEDURE VISIT (OUTPATIENT)
Dept: AUDIOLOGY | Age: 49
End: 2024-05-17
Payer: COMMERCIAL

## 2024-05-17 VITALS
WEIGHT: 198 LBS | HEIGHT: 61 IN | SYSTOLIC BLOOD PRESSURE: 124 MMHG | TEMPERATURE: 97.9 F | BODY MASS INDEX: 37.38 KG/M2 | HEART RATE: 78 BPM | DIASTOLIC BLOOD PRESSURE: 83 MMHG

## 2024-05-17 DIAGNOSIS — H93.A1 PULSATILE TINNITUS, RIGHT EAR: Primary | ICD-10-CM

## 2024-05-17 DIAGNOSIS — H90.3 SENSORINEURAL HEARING LOSS (SNHL) OF BOTH EARS: Primary | ICD-10-CM

## 2024-05-17 DIAGNOSIS — H93.13 TINNITUS OF BOTH EARS: ICD-10-CM

## 2024-05-17 DIAGNOSIS — Z01.818 PREOP TESTING: ICD-10-CM

## 2024-05-17 DIAGNOSIS — H90.3 ASYMMETRIC SNHL (SENSORINEURAL HEARING LOSS): ICD-10-CM

## 2024-05-17 DIAGNOSIS — H90.3 SENSORINEURAL HEARING LOSS (SNHL), BILATERAL: ICD-10-CM

## 2024-05-17 DIAGNOSIS — J30.9 CHRONIC ALLERGIC RHINITIS: ICD-10-CM

## 2024-05-17 PROCEDURE — 92557 COMPREHENSIVE HEARING TEST: CPT

## 2024-05-17 PROCEDURE — 99214 OFFICE O/P EST MOD 30 MIN: CPT

## 2024-05-17 PROCEDURE — 92567 TYMPANOMETRY: CPT

## 2024-05-17 RX ORDER — AZELASTINE 1 MG/ML
2 SPRAY, METERED NASAL 2 TIMES DAILY
Qty: 90 ML | Refills: 3 | Status: SHIPPED | OUTPATIENT
Start: 2024-05-17

## 2024-05-17 RX ORDER — CETIRIZINE HYDROCHLORIDE 10 MG/1
10 TABLET ORAL DAILY
Qty: 90 TABLET | Refills: 3 | Status: SHIPPED | OUTPATIENT
Start: 2024-05-17

## 2024-05-17 RX ORDER — FLUTICASONE PROPIONATE 50 MCG
2 SPRAY, SUSPENSION (ML) NASAL DAILY
Qty: 48 EACH | Refills: 3 | Status: SHIPPED | OUTPATIENT
Start: 2024-05-17

## 2024-05-17 ASSESSMENT — ENCOUNTER SYMPTOMS
VOMITING: 0
BACK PAIN: 0
SHORTNESS OF BREATH: 0
DIARRHEA: 0
SINUS PRESSURE: 0
EYE PAIN: 0
RHINORRHEA: 0
SORE THROAT: 0
EYE DISCHARGE: 0
COUGH: 0

## 2024-05-17 NOTE — PROGRESS NOTES
Subjective:      Patient ID:  Yola Sánchez is a 48 y.o. female.    HPI:    Yola Sánchez presents for recheck today for hearing loss. There are not hearing changes noted by the patient.  There are not new medical issues. Does continue to note tinnitus but is not noting the pulsation as much. Does feel allergy treatment is working and has not seen as much cough as before.     Past Medical History:   Diagnosis Date    Arthritis     Asthma     Diarrhea     Diverticulosis     GERD (gastroesophageal reflux disease)     Stomach pain, controlled with omeprazole    Hyperlipidemia     not on any medications    Ocular migraine     PONV (postoperative nausea and vomiting)      Past Surgical History:   Procedure Laterality Date    ADENOIDECTOMY       SECTION      CHOLECYSTECTOMY, LAPAROSCOPIC      Dr. Holland    HYSTERECTOMY, TOTAL ABDOMINAL (CERVIX REMOVED)      [place] COLONOSCOPY FLX DX W/COLLJ SPEC WHEN PFRMD N/A 2018    COLONOSCOPY DIAGNOSTIC performed by Sidra Vences MD at [facility] ENDOSCOPY    SPINE SURGERY  2015    L4 or L5 Microdiscectomy    UPPER GASTROINTESTINAL ENDOSCOPY      resulted with alkaline reflux, Dr. Holland     Family History   Problem Relation Age of Onset    Other Mother 28        passed of brain aneurysm    High Blood Pressure Father     Other Maternal Grandfather         COPD    Cancer Paternal Grandmother 35        colon    Heart Disease Paternal Grandfather 60     Social History     Socioeconomic History    Marital status:      Spouse name: None    Number of children: None    Years of education: None    Highest education level: None   Tobacco Use    Smoking status: Never     Passive exposure: Yes    Smokeless tobacco: Never   Vaping Use    Vaping Use: Never used   Substance and Sexual Activity    Alcohol use: Yes     Alcohol/week: 2.0 standard drinks of alcohol     Types: 2 Glasses of wine per week     Comment: social    Drug use: No     Social Determinants of Health  DISCHARGE

## 2024-05-20 ENCOUNTER — TELEPHONE (OUTPATIENT)
Dept: ENT CLINIC | Age: 49
End: 2024-05-20

## 2024-05-20 NOTE — TELEPHONE ENCOUNTER
[facility] to authorize order with patient insurance. Patient is scheduled for MRI IAC with radiology on 6/8/24 @ 2:15pm. Patient has been notified of date and time and that they need to arrive at 1:30pm. Patient was informed of her prep prior to procedure. Patient instructed to park in [facility] parking lot and report to registration. Spoke with patient regarding appt details, patient will be out of town that day. Patient has radiology scheduling's phone number from [employer] and will call to reschedule herself.     Electronically signed by Pam Mejia MA on 5/20/24 at 11:37 AM EDT

## 2024-05-20 NOTE — PROGRESS NOTES
This patient was referred for audiometric and tympanometric testing by QASIM Gerard due to tinnitus.     Audiometry using pure tone air and bone conduction testing revealed a mild sensorineural hearing loss through 2000 Hz, rising to within normal limits, in the right ear. The left ear revealed a mild sensorineural hearing loss.  Reliability was good. Speech reception thresholds were in good agreement with the pure tone averages, bilaterally. Speech discrimination scores were excellent, bilaterally.    Tympanometry revealed normal middle ear peak pressure and compliance, bilaterally.    The results were reviewed with the patient.     Recommendations for follow up will be made pending physician consult.    Alonso Casillas/CCC-A  [place] Lic [id number]  Electronically signed by Alonso Casillas on 5/20/2024 at 9:54 AM

## 2024-05-22 DIAGNOSIS — I10 ESSENTIAL HYPERTENSION: ICD-10-CM

## 2024-05-22 RX ORDER — LOSARTAN POTASSIUM 50 MG/1
50 TABLET ORAL DAILY
Qty: 90 TABLET | Refills: 1 | Status: SHIPPED | OUTPATIENT
Start: 2024-05-22

## 2024-05-22 NOTE — TELEPHONE ENCOUNTER
Last Appointment:  2/9/2024  Future Appointments   Date Time Provider Department Center   6/11/2024  1:15 PM [facility]   7/12/2024 10:45 AM Gurjit Agrawal APRN - [facility] [place] [facility]

## 2024-06-04 ENCOUNTER — HOSPITAL ENCOUNTER (OUTPATIENT)
Age: 49
Discharge: HOME | End: 2024-06-06

## 2024-06-04 PROCEDURE — 87045 FECES CULTURE AEROBIC BACT: CPT

## 2024-06-04 PROCEDURE — 87046 STOOL CULTR AEROBIC BACT EA: CPT

## 2024-06-04 PROCEDURE — 87427 SHIGA-LIKE TOXIN AG IA: CPT

## 2024-06-06 LAB
MICROORGANISM SPEC CULT: NORMAL
SPECIMEN DESCRIPTION: NORMAL

## 2024-06-11 ENCOUNTER — HOSPITAL ENCOUNTER (OUTPATIENT)
Age: 49
Discharge: HOME | End: 2024-06-11
Payer: COMMERCIAL

## 2024-06-11 ENCOUNTER — HOSPITAL ENCOUNTER (OUTPATIENT)
Dept: MRI IMAGING | Age: 49
Discharge: HOME | End: 2024-06-13
Payer: COMMERCIAL

## 2024-06-11 DIAGNOSIS — H90.3 ASYMMETRIC SNHL (SENSORINEURAL HEARING LOSS): ICD-10-CM

## 2024-06-11 DIAGNOSIS — H93.A1 PULSATILE TINNITUS, RIGHT EAR: ICD-10-CM

## 2024-06-11 DIAGNOSIS — H90.3 SENSORINEURAL HEARING LOSS (SNHL), BILATERAL: ICD-10-CM

## 2024-06-11 DIAGNOSIS — Z01.818 PREOP TESTING: ICD-10-CM

## 2024-06-11 LAB
BUN SERPL-MCNC: 10 MG/DL (ref 6–20)
CREAT SERPL-MCNC: 0.7 MG/DL (ref 0.5–1)
GFR, ESTIMATED: >90 ML/MIN/1.73M2

## 2024-06-11 PROCEDURE — A9579 GAD-BASE MR CONTRAST NOS,1ML: HCPCS | Performed by: RADIOLOGY

## 2024-06-11 PROCEDURE — 82565 ASSAY OF CREATININE: CPT

## 2024-06-11 PROCEDURE — 70553 MRI BRAIN STEM W/O & W/DYE: CPT

## 2024-06-11 PROCEDURE — 6360000004 HC RX CONTRAST MEDICATION: Performed by: RADIOLOGY

## 2024-06-11 PROCEDURE — 36415 COLL VENOUS BLD VENIPUNCTURE: CPT

## 2024-06-11 PROCEDURE — 84520 ASSAY OF UREA NITROGEN: CPT

## 2024-06-11 RX ADMIN — GADOTERIDOL 18 ML: 279.3 INJECTION, SOLUTION INTRAVENOUS at 14:19

## 2024-06-12 LAB — SURGICAL PATHOLOGY REPORT: NORMAL

## 2024-07-12 ENCOUNTER — OFFICE VISIT (OUTPATIENT)
Dept: ENT CLINIC | Age: 49
End: 2024-07-12
Payer: COMMERCIAL

## 2024-07-12 VITALS
TEMPERATURE: 97.5 F | SYSTOLIC BLOOD PRESSURE: 132 MMHG | WEIGHT: 192.5 LBS | DIASTOLIC BLOOD PRESSURE: 85 MMHG | HEART RATE: 79 BPM | BODY MASS INDEX: 36.35 KG/M2 | HEIGHT: 61 IN

## 2024-07-12 DIAGNOSIS — Z71.2 ENCOUNTER TO DISCUSS TEST RESULTS: ICD-10-CM

## 2024-07-12 DIAGNOSIS — H90.3 ASYMMETRIC SNHL (SENSORINEURAL HEARING LOSS): ICD-10-CM

## 2024-07-12 DIAGNOSIS — H93.A1 PULSATILE TINNITUS, RIGHT EAR: Primary | ICD-10-CM

## 2024-07-12 DIAGNOSIS — H90.3 SENSORINEURAL HEARING LOSS (SNHL), BILATERAL: ICD-10-CM

## 2024-07-12 PROCEDURE — 99212 OFFICE O/P EST SF 10 MIN: CPT

## 2024-07-12 RX ORDER — ROSUVASTATIN CALCIUM 5 MG/1
TABLET, COATED ORAL
COMMUNITY

## 2024-07-12 NOTE — PROGRESS NOTES
verbalize understanding and was in agreement to this plan.  She was instructed to call the office for any new or worsening symptoms prior to her next appointment.    Follow up in 10 month(s)     RX given today:  none      Gurjit Agrawal MSN, FNP-BC  [facility] - Ear, Nose and Throat    The information contained in this note has been dictated using drug and medical speech recognition software and may contain errors

## 2024-10-16 ENCOUNTER — OFFICE VISIT (OUTPATIENT)
Dept: ENT CLINIC | Age: 49
End: 2024-10-16
Payer: COMMERCIAL

## 2024-10-16 VITALS
OXYGEN SATURATION: 96 % | WEIGHT: 181.1 LBS | HEART RATE: 80 BPM | SYSTOLIC BLOOD PRESSURE: 132 MMHG | DIASTOLIC BLOOD PRESSURE: 90 MMHG | BODY MASS INDEX: 34.19 KG/M2 | TEMPERATURE: 98.1 F | HEIGHT: 61 IN

## 2024-10-16 DIAGNOSIS — H93.11 TINNITUS OF RIGHT EAR: ICD-10-CM

## 2024-10-16 DIAGNOSIS — M26.621 TENDERNESS OF RIGHT TEMPOROMANDIBULAR JOINT: Primary | ICD-10-CM

## 2024-10-16 PROCEDURE — 99214 OFFICE O/P EST MOD 30 MIN: CPT

## 2024-10-16 RX ORDER — PREDNISONE 10 MG/1
10 TABLET ORAL 2 TIMES DAILY
Qty: 20 TABLET | Refills: 0 | Status: SHIPPED | OUTPATIENT
Start: 2024-10-16 | End: 2024-10-26

## 2024-10-16 ASSESSMENT — ENCOUNTER SYMPTOMS
RHINORRHEA: 0
BACK PAIN: 0
EYE DISCHARGE: 0
SORE THROAT: 0
EYE PAIN: 0
ALLERGIC/IMMUNOLOGIC NEGATIVE: 1
DIARRHEA: 0
VOMITING: 0
SINUS PRESSURE: 0
SHORTNESS OF BREATH: 0
COUGH: 0

## 2024-10-16 NOTE — PROGRESS NOTES
Head: Normocephalic and atraumatic.      Jaw: There is normal jaw occlusion. Tenderness present.      Right Ear: Tympanic membrane, ear canal and external ear normal.      Left Ear: Tympanic membrane, ear canal and external ear normal.      Ears:      Comments: Right ear pressure       Nose: Nose normal.      Right Turbinates: Not swollen or pale.      Left Turbinates: Not swollen or pale.      Mouth/Throat:      Lips: Pink.      Mouth: Mucous membranes are moist.      Pharynx: Oropharynx is clear.   Eyes:      General: Lids are normal.      Conjunctiva/sclera: Conjunctivae normal.      Pupils: Pupils are equal, round, and reactive to light.   Cardiovascular:      Rate and Rhythm: Normal rate and regular rhythm.      Pulses: Normal pulses.   Pulmonary:      Effort: Pulmonary effort is normal. No respiratory distress.      Breath sounds: No stridor.   Abdominal:      General: Abdomen is flat.      Palpations: Abdomen is soft.   Musculoskeletal:         General: Normal range of motion.      Cervical back: Normal range of motion. No rigidity.   Skin:     General: Skin is warm and dry.   Neurological:      General: No focal deficit present.      Mental Status: She is alert and oriented to person, place, and time.   Psychiatric:         Attention and Perception: Attention normal.         Mood and Affect: Affect normal.         Behavior: Behavior normal. Behavior is cooperative.         Thought Content: Thought content normal.         Judgment: Judgment normal.         Assessment:       Diagnosis Orders   1. Tenderness of right temporomandibular joint        2. Tinnitus of right ear            Plan:      April is a 49-year-old female who returns to the office today for evaluation of right ear pain and pressure and pulsatile tinnitus.  Upon exam there was no evidence of redness, swelling or drainage.  The patient does note that she has gone from experiencing a pulsatile tinnitus to a sound \"like an engine running that

## 2025-06-27 ENCOUNTER — PROCEDURE VISIT (OUTPATIENT)
Dept: AUDIOLOGY | Age: 50
End: 2025-06-27

## 2025-06-27 ENCOUNTER — OFFICE VISIT (OUTPATIENT)
Dept: ENT CLINIC | Age: 50
End: 2025-06-27
Payer: COMMERCIAL

## 2025-06-27 VITALS
HEIGHT: 61 IN | SYSTOLIC BLOOD PRESSURE: 112 MMHG | HEART RATE: 102 BPM | WEIGHT: 197.9 LBS | OXYGEN SATURATION: 97 % | DIASTOLIC BLOOD PRESSURE: 76 MMHG | BODY MASS INDEX: 37.37 KG/M2 | TEMPERATURE: 97.9 F

## 2025-06-27 DIAGNOSIS — H90.3 SENSORINEURAL HEARING LOSS (SNHL) OF BOTH EARS: Primary | ICD-10-CM

## 2025-06-27 DIAGNOSIS — J30.9 CHRONIC ALLERGIC RHINITIS: ICD-10-CM

## 2025-06-27 DIAGNOSIS — H93.13 TINNITUS OF BOTH EARS: ICD-10-CM

## 2025-06-27 DIAGNOSIS — H93.A1 PULSATILE TINNITUS, RIGHT EAR: ICD-10-CM

## 2025-06-27 DIAGNOSIS — H90.3 SENSORINEURAL HEARING LOSS (SNHL), BILATERAL: Primary | ICD-10-CM

## 2025-06-27 PROCEDURE — 99213 OFFICE O/P EST LOW 20 MIN: CPT

## 2025-06-27 RX ORDER — CETIRIZINE HYDROCHLORIDE 10 MG/1
10 TABLET ORAL DAILY
Qty: 90 TABLET | Refills: 3 | Status: SHIPPED | OUTPATIENT
Start: 2025-06-27

## 2025-06-27 ASSESSMENT — ENCOUNTER SYMPTOMS
ALLERGIC/IMMUNOLOGIC NEGATIVE: 1
COUGH: 0
VOMITING: 0
EYE PAIN: 0
SORE THROAT: 0
DIARRHEA: 0
RHINORRHEA: 0
EYE DISCHARGE: 0
SHORTNESS OF BREATH: 0
BACK PAIN: 0
SINUS PRESSURE: 0

## 2025-06-27 NOTE — PROGRESS NOTES
Subjective:      Patient ID:  Yola Sánchez is a 49 y.o. female.    HPI:    Yola Sánchez presents for recheck today for hearing loss. There are not hearing changes noted by the patient.  There are not new medical issues. Does have right ear fullness. Has been on zyrtec. Since completion of spring allergy season has been able to hold astelin and floanse.     Past Medical History:   Diagnosis Date    Arthritis     Asthma     Diarrhea     Diverticulosis     GERD (gastroesophageal reflux disease)     Stomach pain, controlled with omeprazole    Hyperlipidemia     not on any medications    Ocular migraine     PONV (postoperative nausea and vomiting)      Past Surgical History:   Procedure Laterality Date    ADENOIDECTOMY       SECTION      CHOLECYSTECTOMY, LAPAROSCOPIC      Dr. Holland    HYSTERECTOMY, TOTAL ABDOMINAL (CERVIX REMOVED)      [place] COLONOSCOPY FLX DX W/COLLJ SPEC WHEN PFRMD N/A 2018    COLONOSCOPY DIAGNOSTIC performed by Sidra Vences MD at [facility] ENDOSCOPY    SPINE SURGERY  2015    L4 or L5 Microdiscectomy    UPPER GASTROINTESTINAL ENDOSCOPY      resulted with alkaline reflux, Dr. Holland     Family History   Problem Relation Age of Onset    Other Mother 28        passed of brain aneurysm    High Blood Pressure Father     Other Maternal Grandfather         COPD    Cancer Paternal Grandmother 35        colon    Heart Disease Paternal Grandfather 60     Social History     Socioeconomic History    Marital status:      Spouse name: None    Number of children: None    Years of education: None    Highest education level: None   Tobacco Use    Smoking status: Never     Passive exposure: Yes    Smokeless tobacco: Never   Vaping Use    Vaping status: Never Used   Substance and Sexual Activity    Alcohol use: Yes     Alcohol/week: 2.0 standard drinks of alcohol     Types: 2 Glasses of wine per week     Comment: social    Drug use: No     Social Drivers of Health     Financial

## 2025-06-27 NOTE — PROGRESS NOTES
This patient was referred for audiometric/tympanometric testing by QASIM Gerard due to annual hearing testing.      Audiometry using pure tone air and bone conduction revealed mild sensorineural hearing loss through 2000 Hz rising to borderline normal to mild through 8000 Hz bilaterally.   Reliability was good. Speech reception thresholds were in good agreement with the pure tone averages, bilaterally. Speech discrimination scores were excellent, bilaterally.    Tympanometry revealed normal middle ear peak pressure and compliance, bilaterally.      Hearing overall stable since last audio.       The results were reviewed with the patient and CNP.     Recommendations for follow up will be made pending ordering provider consult.    Alonso Mart/CCC-A  [place] Lic # [id number]

## (undated) DEVICE — GRADUATE TRIANG MEASURE 1000ML BLK PRNT